# Patient Record
Sex: FEMALE | Race: BLACK OR AFRICAN AMERICAN | NOT HISPANIC OR LATINO | Employment: OTHER | URBAN - METROPOLITAN AREA
[De-identification: names, ages, dates, MRNs, and addresses within clinical notes are randomized per-mention and may not be internally consistent; named-entity substitution may affect disease eponyms.]

---

## 2017-01-09 PROBLEM — I25.2 HX OF NON-ST ELEVATION MYOCARDIAL INFARCTION (NSTEMI): Status: ACTIVE | Noted: 2017-01-09

## 2017-01-09 PROBLEM — Z01.818 PRE-OP EVALUATION: Status: ACTIVE | Noted: 2017-01-09

## 2017-02-14 PROBLEM — H26.9 CATARACT: Status: ACTIVE | Noted: 2017-02-14

## 2017-07-14 PROBLEM — E11.311 DIABETIC MACULAR EDEMA: Status: ACTIVE | Noted: 2017-07-14

## 2017-09-11 PROBLEM — H25.812 COMBINED FORMS OF AGE-RELATED CATARACT OF LEFT EYE: Status: ACTIVE | Noted: 2017-09-11

## 2017-12-05 PROBLEM — R82.90 FOUL SMELLING URINE: Status: ACTIVE | Noted: 2017-12-05

## 2017-12-05 PROBLEM — K59.00 CONSTIPATION: Status: ACTIVE | Noted: 2017-12-05

## 2018-02-08 PROBLEM — R21 RASH: Status: ACTIVE | Noted: 2018-02-08

## 2018-02-08 PROBLEM — L29.9 ITCHING: Status: ACTIVE | Noted: 2018-02-08

## 2018-08-09 PROBLEM — N30.00 ACUTE CYSTITIS: Status: ACTIVE | Noted: 2018-08-09

## 2018-08-09 PROBLEM — R30.0 DYSURIA: Status: ACTIVE | Noted: 2018-08-09

## 2018-08-09 PROBLEM — I16.0 HYPERTENSIVE URGENCY: Status: ACTIVE | Noted: 2018-08-09

## 2018-08-11 PROBLEM — N30.00 ACUTE CYSTITIS: Status: ACTIVE | Noted: 2018-08-11

## 2018-08-22 PROBLEM — M06.9 RHEUMATOID ARTHRITIS OF HAND: Status: ACTIVE | Noted: 2018-08-22

## 2018-09-18 PROBLEM — Z12.11 SCREENING FOR COLON CANCER: Status: ACTIVE | Noted: 2018-09-18

## 2018-10-24 PROBLEM — I16.1 HYPERTENSIVE EMERGENCY: Status: ACTIVE | Noted: 2018-10-24

## 2018-10-24 PROBLEM — R33.9 URINARY RETENTION: Status: ACTIVE | Noted: 2018-10-24

## 2018-10-25 PROBLEM — R31.9 HEMATURIA: Status: ACTIVE | Noted: 2018-10-25

## 2018-10-26 PROBLEM — N17.9 AKI (ACUTE KIDNEY INJURY): Status: ACTIVE | Noted: 2018-10-26

## 2018-10-29 ENCOUNTER — PATIENT OUTREACH (OUTPATIENT)
Dept: ADMINISTRATIVE | Facility: CLINIC | Age: 58
End: 2018-10-29

## 2018-10-29 NOTE — PATIENT INSTRUCTIONS
"Discharge Instructions for High Blood Pressure (Hypertension)  You have been diagnosed with high blood pressure (also called hypertension). This means the force of blood against your artery walls is too strong. It also means your heart is working hard to move blood. High blood pressure usually has no symptoms, but over time, it can damage your heart, blood vessels, eyes, kidneys, and other organs. With help from your doctor, you can manage your blood pressure and protect your health.  Taking medicine  · Learn to take your own blood pressure. Keep a record of your results. Ask your doctor which readings mean that you need medical attention.  · Take your blood pressure medicine exactly as directed. Dont skip doses. Missing doses can cause your blood pressure to get out of control.  · If you do miss a dose (or doses) check with your healthcare provider about what to do.  · Avoid medicine that contain heart stimulants, including over-the-counter drugs. Check for warnings about high blood pressure on the label. Ask the pharmacist before purchasing something you haven't used before  · Check with your doctor or pharmacist before taking a decongestant. Some decongestants can worsen high blood pressure.  Lifestyle changes  · Maintain a healthy weight. Get help to lose any extra pounds.  · Cut back on salt.  ? Limit canned, dried, packaged, and fast foods.  ? Dont add salt to your food at the table.  ? Season foods with herbs instead of salt when you cook.  ? Request no added salt when you go to a restaurant.  ? The American Heart Associations (AHA) "ideal" sodium intake recommendation is 1,500 milligrams per day.  However, since American's eat so much salt, the AHA says a positive change can occur by cutting back to even 2,400 milligrams of sodium a day.   · Follow the DASH (Dietary Approaches to Stop Hypertension) eating plan. This plan recommends vegetables, fruits, whole gains, and other heart healthy foods.  · Begin " an exercise program. Ask your doctor how to get started. The American Heart Association recommends aerobic exercise 3 to 4 times a week for an average of 40 minutes at a time, with your doctor's approval. Simple activities like walking or gardening can help.  · Break the smoking habit. Enroll in a stop-smoking program to improve your chances of success. Ask your healthcare provider about programs and medicines to help you stop smoking.  · Limit drinks that contain caffeine (coffee, black or green tea, cola) to 2 per day.  · Never take stimulants such as amphetamines or cocaine; these drugs can be deadly for someone with high blood pressure.  · Control your stress. Learn stress-management techniques.  · Limit alcohol to no more than 1 drink a day for women and 2 drinks a day for men.  Follow-up care  Make a follow-up appointment as directed by our staff.     When to seek medical care  Call your doctor immediately or seek emergency care if you have any of the following:  · Chest pain or shortness of breath (call 911)  · Moderate to severe headache  · Weakness in the muscles of your face, arms, or legs  · Trouble speaking  · Extreme drowsiness  · Confusion  · Fainting or dizziness  · Pulsating or rushing sound in your ears  · Unexplained nosebleed  · Weakness, tingling, or numbness of your face, arms, or legs  · Change in vision  · Blood pressure measured at home that is greater than 180/110   Date Last Reviewed: 4/27/2016  © 9783-2834 [employer]. [address] [place] [address], [place] [address]. All rights reserved. This information is not intended as a substitute for professional medical care. Always follow your healthcare professional's instructions.

## 2018-10-29 NOTE — PROGRESS NOTES
[phone] [place]  [phone] [facility]  [phone] [place]  [phone]   C3 nurse attempted to contact patient. The following occurred:   C3 nurse attempted to contact Lilly Frye for a TCC post hospital discharge follow up call. The patient is unable to conduct the call @ this time. The patient requested a callback.    The patient has a scheduled HOSFU appointment with Dr Kenney Davies on 11/08/18  @ 0900 hrs.

## 2018-11-07 PROBLEM — I50.9 ACUTE ON CHRONIC CONGESTIVE HEART FAILURE: Status: ACTIVE | Noted: 2018-11-07

## 2018-11-07 PROBLEM — D63.8 ANEMIA OF CHRONIC DISEASE: Status: ACTIVE | Noted: 2018-11-07

## 2018-11-12 ENCOUNTER — HOSPITAL ENCOUNTER (INPATIENT)
Facility: HOSPITAL | Age: 58
LOS: 2 days | Discharge: HOME | DRG: 247 | End: 2018-11-14
Attending: INTERNAL MEDICINE | Admitting: INTERNAL MEDICINE
Payer: MEDICARE

## 2018-11-12 DIAGNOSIS — I10 ESSENTIAL HYPERTENSION: Chronic | ICD-10-CM

## 2018-11-12 DIAGNOSIS — I50.9 CHF (CONGESTIVE HEART FAILURE): ICD-10-CM

## 2018-11-12 DIAGNOSIS — I25.83 CORONARY ARTERY DISEASE DUE TO LIPID RICH PLAQUE: Primary | ICD-10-CM

## 2018-11-12 DIAGNOSIS — Z79.4 TYPE 2 DIABETES MELLITUS WITH COMPLICATION, WITH LONG-TERM CURRENT USE OF INSULIN: ICD-10-CM

## 2018-11-12 DIAGNOSIS — E11.8 TYPE 2 DIABETES MELLITUS WITH COMPLICATION, WITHOUT LONG-TERM CURRENT USE OF INSULIN: ICD-10-CM

## 2018-11-12 DIAGNOSIS — R56.9 SEIZURE: ICD-10-CM

## 2018-11-12 DIAGNOSIS — I50.9 CONGESTIVE HEART FAILURE, UNSPECIFIED HF CHRONICITY, UNSPECIFIED HEART FAILURE TYPE: ICD-10-CM

## 2018-11-12 DIAGNOSIS — E11.8 TYPE 2 DIABETES MELLITUS WITH COMPLICATION, WITH LONG-TERM CURRENT USE OF INSULIN: ICD-10-CM

## 2018-11-12 DIAGNOSIS — I50.9 ACUTE ON CHRONIC HEART FAILURE, UNSPECIFIED HEART FAILURE TYPE: ICD-10-CM

## 2018-11-12 DIAGNOSIS — Z09 POSTOPERATIVE EXAMINATION: ICD-10-CM

## 2018-11-12 DIAGNOSIS — I25.10 CORONARY ARTERY DISEASE DUE TO LIPID RICH PLAQUE: Primary | ICD-10-CM

## 2018-11-12 DIAGNOSIS — I25.10 CORONARY ARTERY DISEASE DUE TO LIPID RICH PLAQUE: ICD-10-CM

## 2018-11-12 DIAGNOSIS — I25.83 CORONARY ARTERY DISEASE DUE TO LIPID RICH PLAQUE: ICD-10-CM

## 2018-11-12 PROBLEM — R79.89 ELEVATED TROPONIN: Status: ACTIVE | Noted: 2018-11-12

## 2018-11-12 PROBLEM — J96.21 ACUTE ON CHRONIC RESPIRATORY FAILURE WITH HYPOXIA: Status: ACTIVE | Noted: 2018-11-12

## 2018-11-12 LAB — POCT GLUCOSE: 135 MG/DL (ref 70–110)

## 2018-11-12 PROCEDURE — 25000003 PHARM REV CODE 250: Performed by: INTERNAL MEDICINE

## 2018-11-12 PROCEDURE — 11000001 HC ACUTE MED/SURG PRIVATE ROOM

## 2018-11-12 PROCEDURE — 63600175 PHARM REV CODE 636 W HCPCS: Performed by: INTERNAL MEDICINE

## 2018-11-12 PROCEDURE — S5571 INSULIN DISPOS PEN 3 ML: HCPCS | Performed by: INTERNAL MEDICINE

## 2018-11-12 RX ORDER — HYDROCODONE BITARTRATE AND ACETAMINOPHEN 5; 325 MG/1; MG/1
1 TABLET ORAL EVERY 6 HOURS PRN
Status: DISCONTINUED | OUTPATIENT
Start: 2018-11-12 | End: 2018-11-14 | Stop reason: HOSPADM

## 2018-11-12 RX ORDER — AMOXICILLIN 250 MG
1 CAPSULE ORAL 2 TIMES DAILY PRN
Status: DISCONTINUED | OUTPATIENT
Start: 2018-11-12 | End: 2018-11-14 | Stop reason: HOSPADM

## 2018-11-12 RX ORDER — HYDRALAZINE HYDROCHLORIDE 20 MG/ML
10 INJECTION INTRAMUSCULAR; INTRAVENOUS EVERY 8 HOURS PRN
Status: DISCONTINUED | OUTPATIENT
Start: 2018-11-12 | End: 2018-11-14 | Stop reason: HOSPADM

## 2018-11-12 RX ORDER — NITROGLYCERIN 0.4 MG/1
0.4 TABLET SUBLINGUAL EVERY 5 MIN PRN
Status: DISCONTINUED | OUTPATIENT
Start: 2018-11-12 | End: 2018-11-14 | Stop reason: HOSPADM

## 2018-11-12 RX ORDER — FLUOXETINE HYDROCHLORIDE 20 MG/1
20 CAPSULE ORAL DAILY
Status: DISCONTINUED | OUTPATIENT
Start: 2018-11-13 | End: 2018-11-14 | Stop reason: HOSPADM

## 2018-11-12 RX ORDER — ASPIRIN 81 MG/1
81 TABLET ORAL DAILY
Status: DISCONTINUED | OUTPATIENT
Start: 2018-11-13 | End: 2018-11-14 | Stop reason: HOSPADM

## 2018-11-12 RX ORDER — ONDANSETRON 2 MG/ML
4 INJECTION INTRAMUSCULAR; INTRAVENOUS EVERY 6 HOURS PRN
Status: DISCONTINUED | OUTPATIENT
Start: 2018-11-12 | End: 2018-11-14 | Stop reason: HOSPADM

## 2018-11-12 RX ORDER — LOSARTAN POTASSIUM 50 MG/1
50 TABLET ORAL NIGHTLY
Status: DISCONTINUED | OUTPATIENT
Start: 2018-11-12 | End: 2018-11-14 | Stop reason: HOSPADM

## 2018-11-12 RX ORDER — ACETAMINOPHEN 325 MG/1
650 TABLET ORAL EVERY 6 HOURS PRN
Status: DISCONTINUED | OUTPATIENT
Start: 2018-11-12 | End: 2018-11-14 | Stop reason: HOSPADM

## 2018-11-12 RX ORDER — INSULIN ASPART 100 [IU]/ML
0-5 INJECTION, SOLUTION INTRAVENOUS; SUBCUTANEOUS
Status: DISCONTINUED | OUTPATIENT
Start: 2018-11-12 | End: 2018-11-13

## 2018-11-12 RX ORDER — LEVETIRACETAM 500 MG/1
1000 TABLET ORAL 2 TIMES DAILY
Status: DISCONTINUED | OUTPATIENT
Start: 2018-11-12 | End: 2018-11-14 | Stop reason: HOSPADM

## 2018-11-12 RX ORDER — SODIUM CHLORIDE 0.9 % (FLUSH) 0.9 %
5 SYRINGE (ML) INJECTION
Status: DISCONTINUED | OUTPATIENT
Start: 2018-11-12 | End: 2018-11-14 | Stop reason: HOSPADM

## 2018-11-12 RX ORDER — ATORVASTATIN CALCIUM 40 MG/1
80 TABLET, FILM COATED ORAL DAILY
Status: DISCONTINUED | OUTPATIENT
Start: 2018-11-13 | End: 2018-11-14 | Stop reason: HOSPADM

## 2018-11-12 RX ORDER — INSULIN ASPART 100 [IU]/ML
14 INJECTION, SOLUTION INTRAVENOUS; SUBCUTANEOUS
Status: DISCONTINUED | OUTPATIENT
Start: 2018-11-13 | End: 2018-11-14 | Stop reason: HOSPADM

## 2018-11-12 RX ORDER — CLOPIDOGREL BISULFATE 75 MG/1
75 TABLET ORAL DAILY
Status: DISCONTINUED | OUTPATIENT
Start: 2018-11-13 | End: 2018-11-14 | Stop reason: HOSPADM

## 2018-11-12 RX ORDER — CARVEDILOL 25 MG/1
25 TABLET ORAL 2 TIMES DAILY
Status: DISCONTINUED | OUTPATIENT
Start: 2018-11-12 | End: 2018-11-14 | Stop reason: HOSPADM

## 2018-11-12 RX ADMIN — INSULIN DETEMIR 35 UNITS: 100 INJECTION, SOLUTION SUBCUTANEOUS at 10:11

## 2018-11-12 RX ADMIN — LOSARTAN POTASSIUM 50 MG: 50 TABLET, FILM COATED ORAL at 10:11

## 2018-11-12 RX ADMIN — LEVETIRACETAM 1000 MG: 500 TABLET ORAL at 10:11

## 2018-11-12 RX ADMIN — CARVEDILOL 25 MG: 25 TABLET, FILM COATED ORAL at 10:11

## 2018-11-12 NOTE — Clinical Note
100 ml injected throughout the case. 50 mL total wasted during the case. 150 mL total used in the case.

## 2018-11-12 NOTE — Clinical Note
The site was marked. Prepped: groin and right radial. Prepped with: ChloraPrep. The site was clipped. The patient was draped.

## 2018-11-12 NOTE — Clinical Note
The DP pulses are detected w/ doppler bilaterally. The PT pulses are detected w/ doppler bilaterally. The right radial pulse is 2+.

## 2018-11-13 LAB
ALBUMIN SERPL BCP-MCNC: 1.8 G/DL
ALP SERPL-CCNC: 81 U/L
ALT SERPL W/O P-5'-P-CCNC: 22 U/L
ANION GAP SERPL CALC-SCNC: 6 MMOL/L
ANION GAP SERPL CALC-SCNC: 6 MMOL/L
AST SERPL-CCNC: 28 U/L
BILIRUB SERPL-MCNC: 0.3 MG/DL
BUN SERPL-MCNC: 23 MG/DL
BUN SERPL-MCNC: 24 MG/DL
CALCIUM SERPL-MCNC: 8.7 MG/DL
CALCIUM SERPL-MCNC: 8.8 MG/DL
CHLORIDE SERPL-SCNC: 103 MMOL/L
CHLORIDE SERPL-SCNC: 104 MMOL/L
CO2 SERPL-SCNC: 30 MMOL/L
CO2 SERPL-SCNC: 31 MMOL/L
CREAT SERPL-MCNC: 1.2 MG/DL
CREAT SERPL-MCNC: 1.3 MG/DL
ERYTHROCYTE [DISTWIDTH] IN BLOOD BY AUTOMATED COUNT: 12.9 %
EST. GFR  (AFRICAN AMERICAN): 52 ML/MIN/1.73 M^2
EST. GFR  (AFRICAN AMERICAN): 57 ML/MIN/1.73 M^2
EST. GFR  (NON AFRICAN AMERICAN): 45 ML/MIN/1.73 M^2
EST. GFR  (NON AFRICAN AMERICAN): 50 ML/MIN/1.73 M^2
GLUCOSE SERPL-MCNC: 138 MG/DL
GLUCOSE SERPL-MCNC: 142 MG/DL
HCT VFR BLD AUTO: 27 %
HGB BLD-MCNC: 8.3 G/DL
MCH RBC QN AUTO: 28.6 PG
MCHC RBC AUTO-ENTMCNC: 30.7 G/DL
MCV RBC AUTO: 93 FL
PLATELET # BLD AUTO: 313 K/UL
PMV BLD AUTO: 9.7 FL
POC ACTIVATED CLOTTING TIME K: 197 SEC (ref 74–137)
POC ACTIVATED CLOTTING TIME K: 263 SEC (ref 74–137)
POCT GLUCOSE: 163 MG/DL (ref 70–110)
POCT GLUCOSE: 266 MG/DL (ref 70–110)
POCT GLUCOSE: 302 MG/DL (ref 70–110)
POCT GLUCOSE: 35 MG/DL (ref 70–110)
POCT GLUCOSE: 51 MG/DL (ref 70–110)
POCT GLUCOSE: 88 MG/DL (ref 70–110)
POTASSIUM SERPL-SCNC: 4.3 MMOL/L
POTASSIUM SERPL-SCNC: 4.5 MMOL/L
PROT SERPL-MCNC: 5.7 G/DL
RBC # BLD AUTO: 2.9 M/UL
SAMPLE: ABNORMAL
SAMPLE: ABNORMAL
SODIUM SERPL-SCNC: 140 MMOL/L
SODIUM SERPL-SCNC: 140 MMOL/L
WBC # BLD AUTO: 4.3 K/UL

## 2018-11-13 PROCEDURE — C1876 STENT, NON-COA/NON-COV W/DEL: HCPCS | Performed by: INTERNAL MEDICINE

## 2018-11-13 PROCEDURE — C1769 GUIDE WIRE: HCPCS | Performed by: INTERNAL MEDICINE

## 2018-11-13 PROCEDURE — 93458 L HRT ARTERY/VENTRICLE ANGIO: CPT | Mod: 59 | Performed by: INTERNAL MEDICINE

## 2018-11-13 PROCEDURE — 027034Z DILATION OF CORONARY ARTERY, ONE ARTERY WITH DRUG-ELUTING INTRALUMINAL DEVICE, PERCUTANEOUS APPROACH: ICD-10-PCS | Performed by: INTERNAL MEDICINE

## 2018-11-13 PROCEDURE — 25500020 PHARM REV CODE 255: Performed by: INTERNAL MEDICINE

## 2018-11-13 PROCEDURE — 27201423 OPTIME MED/SURG SUP & DEVICES STERILE SUPPLY: Performed by: INTERNAL MEDICINE

## 2018-11-13 PROCEDURE — 94761 N-INVAS EAR/PLS OXIMETRY MLT: CPT

## 2018-11-13 PROCEDURE — 63600175 PHARM REV CODE 636 W HCPCS

## 2018-11-13 PROCEDURE — 99152 MOD SED SAME PHYS/QHP 5/>YRS: CPT | Mod: ,,, | Performed by: INTERNAL MEDICINE

## 2018-11-13 PROCEDURE — 93005 ELECTROCARDIOGRAM TRACING: CPT

## 2018-11-13 PROCEDURE — 63600175 PHARM REV CODE 636 W HCPCS: Performed by: INTERNAL MEDICINE

## 2018-11-13 PROCEDURE — 92928 PRQ TCAT PLMT NTRAC ST 1 LES: CPT | Mod: LD,,, | Performed by: INTERNAL MEDICINE

## 2018-11-13 PROCEDURE — C1753 CATH, INTRAVAS ULTRASOUND: HCPCS | Performed by: INTERNAL MEDICINE

## 2018-11-13 PROCEDURE — 25000003 PHARM REV CODE 250: Performed by: INTERNAL MEDICINE

## 2018-11-13 PROCEDURE — 99223 1ST HOSP IP/OBS HIGH 75: CPT | Mod: AI,,, | Performed by: INTERNAL MEDICINE

## 2018-11-13 PROCEDURE — 85027 COMPLETE CBC AUTOMATED: CPT

## 2018-11-13 PROCEDURE — 25000003 PHARM REV CODE 250

## 2018-11-13 PROCEDURE — C1887 CATHETER, GUIDING: HCPCS | Performed by: INTERNAL MEDICINE

## 2018-11-13 PROCEDURE — 4A023N7 MEASUREMENT OF CARDIAC SAMPLING AND PRESSURE, LEFT HEART, PERCUTANEOUS APPROACH: ICD-10-PCS | Performed by: INTERNAL MEDICINE

## 2018-11-13 PROCEDURE — 80048 BASIC METABOLIC PNL TOTAL CA: CPT

## 2018-11-13 PROCEDURE — 25500020 PHARM REV CODE 255

## 2018-11-13 PROCEDURE — C1725 CATH, TRANSLUMIN NON-LASER: HCPCS | Performed by: INTERNAL MEDICINE

## 2018-11-13 PROCEDURE — 80053 COMPREHEN METABOLIC PANEL: CPT

## 2018-11-13 PROCEDURE — 25000003 PHARM REV CODE 250: Performed by: NURSE PRACTITIONER

## 2018-11-13 PROCEDURE — 11000001 HC ACUTE MED/SURG PRIVATE ROOM

## 2018-11-13 PROCEDURE — 93458 L HRT ARTERY/VENTRICLE ANGIO: CPT | Mod: 26,59,, | Performed by: INTERNAL MEDICINE

## 2018-11-13 PROCEDURE — 92928 PRQ TCAT PLMT NTRAC ST 1 LES: CPT | Mod: LD | Performed by: INTERNAL MEDICINE

## 2018-11-13 PROCEDURE — 36415 COLL VENOUS BLD VENIPUNCTURE: CPT

## 2018-11-13 PROCEDURE — 63600175 PHARM REV CODE 636 W HCPCS: Performed by: NURSE PRACTITIONER

## 2018-11-13 PROCEDURE — C1894 INTRO/SHEATH, NON-LASER: HCPCS | Performed by: INTERNAL MEDICINE

## 2018-11-13 DEVICE — IMPLANTABLE DEVICE: Type: IMPLANTABLE DEVICE | Site: CORONARY | Status: FUNCTIONAL

## 2018-11-13 RX ORDER — HEPARIN SODIUM 1000 [USP'U]/ML
INJECTION, SOLUTION INTRAVENOUS; SUBCUTANEOUS
Status: DISCONTINUED | OUTPATIENT
Start: 2018-11-13 | End: 2018-11-13 | Stop reason: HOSPADM

## 2018-11-13 RX ORDER — GLUCAGON 1 MG
1 KIT INJECTION
Status: DISCONTINUED | OUTPATIENT
Start: 2018-11-13 | End: 2018-11-14 | Stop reason: HOSPADM

## 2018-11-13 RX ORDER — IBUPROFEN 200 MG
24 TABLET ORAL
Status: DISCONTINUED | OUTPATIENT
Start: 2018-11-13 | End: 2018-11-14 | Stop reason: HOSPADM

## 2018-11-13 RX ORDER — MIDAZOLAM HYDROCHLORIDE 1 MG/ML
INJECTION, SOLUTION INTRAMUSCULAR; INTRAVENOUS
Status: DISCONTINUED | OUTPATIENT
Start: 2018-11-13 | End: 2018-11-13 | Stop reason: HOSPADM

## 2018-11-13 RX ORDER — HEPARIN SODIUM 200 [USP'U]/100ML
INJECTION, SOLUTION INTRAVENOUS
Status: DISCONTINUED | OUTPATIENT
Start: 2018-11-13 | End: 2018-11-14 | Stop reason: HOSPADM

## 2018-11-13 RX ORDER — IODIXANOL 320 MG/ML
INJECTION, SOLUTION INTRAVASCULAR
Status: DISCONTINUED | OUTPATIENT
Start: 2018-11-13 | End: 2018-11-13 | Stop reason: HOSPADM

## 2018-11-13 RX ORDER — IBUPROFEN 200 MG
16 TABLET ORAL
Status: DISCONTINUED | OUTPATIENT
Start: 2018-11-13 | End: 2018-11-14 | Stop reason: HOSPADM

## 2018-11-13 RX ORDER — SODIUM CHLORIDE 9 MG/ML
INJECTION, SOLUTION INTRAVENOUS CONTINUOUS
Status: DISCONTINUED | OUTPATIENT
Start: 2018-11-13 | End: 2018-11-14 | Stop reason: HOSPADM

## 2018-11-13 RX ORDER — INSULIN ASPART 100 [IU]/ML
1-10 INJECTION, SOLUTION INTRAVENOUS; SUBCUTANEOUS
Status: DISCONTINUED | OUTPATIENT
Start: 2018-11-13 | End: 2018-11-14 | Stop reason: HOSPADM

## 2018-11-13 RX ORDER — DIPHENHYDRAMINE HCL 25 MG
50 CAPSULE ORAL ONCE
Status: COMPLETED | OUTPATIENT
Start: 2018-11-13 | End: 2018-11-13

## 2018-11-13 RX ORDER — LIDOCAINE HYDROCHLORIDE 10 MG/ML
INJECTION, SOLUTION EPIDURAL; INFILTRATION; INTRACAUDAL; PERINEURAL
Status: DISCONTINUED | OUTPATIENT
Start: 2018-11-13 | End: 2018-11-13 | Stop reason: HOSPADM

## 2018-11-13 RX ORDER — VERAPAMIL HYDROCHLORIDE 2.5 MG/ML
INJECTION, SOLUTION INTRAVENOUS
Status: DISCONTINUED | OUTPATIENT
Start: 2018-11-13 | End: 2018-11-13 | Stop reason: HOSPADM

## 2018-11-13 RX ORDER — FENTANYL CITRATE 50 UG/ML
INJECTION, SOLUTION INTRAMUSCULAR; INTRAVENOUS
Status: DISCONTINUED | OUTPATIENT
Start: 2018-11-13 | End: 2018-11-13 | Stop reason: HOSPADM

## 2018-11-13 RX ORDER — DEXTROSE 50 % IN WATER (D50W) INTRAVENOUS SYRINGE
Status: DISCONTINUED | OUTPATIENT
Start: 2018-11-13 | End: 2018-11-13 | Stop reason: HOSPADM

## 2018-11-13 RX ADMIN — LOSARTAN POTASSIUM 50 MG: 50 TABLET, FILM COATED ORAL at 09:11

## 2018-11-13 RX ADMIN — ATORVASTATIN CALCIUM 80 MG: 40 TABLET, FILM COATED ORAL at 08:11

## 2018-11-13 RX ADMIN — CARVEDILOL 25 MG: 25 TABLET, FILM COATED ORAL at 09:11

## 2018-11-13 RX ADMIN — LEVETIRACETAM 1000 MG: 500 TABLET ORAL at 08:11

## 2018-11-13 RX ADMIN — INSULIN ASPART 4 UNITS: 100 INJECTION, SOLUTION INTRAVENOUS; SUBCUTANEOUS at 09:11

## 2018-11-13 RX ADMIN — ASPIRIN 81 MG: 81 TABLET, COATED ORAL at 08:11

## 2018-11-13 RX ADMIN — LEVETIRACETAM 1000 MG: 500 TABLET ORAL at 09:11

## 2018-11-13 RX ADMIN — DIPHENHYDRAMINE HYDROCHLORIDE 50 MG: 25 CAPSULE ORAL at 02:11

## 2018-11-13 RX ADMIN — INSULIN ASPART 14 UNITS: 100 INJECTION, SOLUTION INTRAVENOUS; SUBCUTANEOUS at 08:11

## 2018-11-13 RX ADMIN — INSULIN DETEMIR 35 UNITS: 100 INJECTION, SOLUTION SUBCUTANEOUS at 09:11

## 2018-11-13 RX ADMIN — CARVEDILOL 25 MG: 25 TABLET, FILM COATED ORAL at 08:11

## 2018-11-13 RX ADMIN — SODIUM CHLORIDE: 0.9 INJECTION, SOLUTION INTRAVENOUS at 02:11

## 2018-11-13 RX ADMIN — DEXTROSE MONOHYDRATE 25 G: 25 INJECTION, SOLUTION INTRAVENOUS at 11:11

## 2018-11-13 RX ADMIN — CLOPIDOGREL BISULFATE 75 MG: 75 TABLET ORAL at 08:11

## 2018-11-13 RX ADMIN — FLUOXETINE 20 MG: 20 CAPSULE ORAL at 08:11

## 2018-11-13 NOTE — PLAN OF CARE
11/13/18 1045   Final Note   Assessment Type Final Discharge Note   Anticipated Discharge Disposition Other Fac MI   What phone number can be called within the next 1-3 days to see how you are doing after discharge? [phone]   Hospital Follow Up  Appt(s) scheduled? Yes   Discharge plans and expectations educations in teach back method with documentation complete? Yes   Right Care Referral Info   Post Acute Recommendation IRF  (Pt transferred to another medical facility for medical care.)

## 2018-11-13 NOTE — HOSPITAL COURSE
11/13/2018 NPO for [facility] today. Hemodynamically stable. Cr 1.2.   See report for details  Prox LAD lesion is 95% stenosed. This is the culprit lesion. The lesion is < 10 mm long. Lesion shape is eccentric. Lesion contour is smooth. The lesion was not previously treated.  STENT RESOLUTE INTGRTY 4.00X15  11/14/2018 Patient was recovered overnight without incident and found to be tolerating her ADLs at baseline. Patient appropriate for discharge. Compliance with medication was discussed with particular attention paid to importance of  DAPT for one year without interruption. The risk of abrupt stent occlusion and MI with early discontinuation was specifically stressed. Verbalized understanding and will follow up in the cardiology clinic in 2-3 weeks

## 2018-11-13 NOTE — PLAN OF CARE
Problem: Patient Care Overview  Goal: Plan of Care Review  Outcome: Ongoing (interventions implemented as appropriate)  Plan of care reviewed with patient. Patient verbalized complete understanding. Patient went for cath lab procedure today Fall precautions maintained. Bed in lowest position, locked, call light within reach, and bed alarm on. Side rails up x2 with slip resistant socks on. Nurse instructed patient to notify staff for any assistance and patient verbalized complete understanding. Patient on telemetry throughout shift with no ectopy noted. will continue to monitor

## 2018-11-13 NOTE — PLAN OF CARE
Problem: Patient Care Overview  Goal: Plan of Care Review  Outcome: Ongoing (interventions implemented as appropriate)  Plan of care reviewed with patient. Patient is AAOx4. On continuous cardiac monitoring, no true red alarms noted. Patient rested comfortably throughout night. O2 going at 2L NC. NS going at 100 mL/hr. NPO diet order maintained. Fall precautions explained and maintained. Advised patient to use call light for assistance, verbalized understanding. Will continue to monitor.

## 2018-11-13 NOTE — NURSING
Pt's BS 51. Pt currently in post cath. Spoke to Janet and she stated that the nurse is addressing it. VN notified floor nurse.

## 2018-11-13 NOTE — HPI
Lilly Frye is a 58 y.o. female with CAD s/p 2 stents in 2015, CVA, seizures, HTN, HFpEF, uncontrolled DM2 (A1C >14), neuropathy, depression. Presented to [facility] ED with complaint of CP x 1 day and worsening SOB x 2 days. Had recent admit 2 weeks ago for same complaint, at that time was found to have NSTEMI believed to be 2/2 demand (uncontrolled HTN). She was ordered an outpatient stress test but has not yet undergone testing. CP was described as substernal, L sided, 10/10, sharp, and occurring while at rest. Patient states she can only walk a few feet before becoming SOB. Patient has worsened orthopnea from 2 pillows to 3 pillows and worsened leg swelling. Patient has not been adherent to low sodium diet, often eating ramen noodles or other processed foods. Patient did try to increase oral lasix at home to 80mg BID, but without relief. EKG showed no acute ST elevation but trop was elevated at 0.126, and BNP of 944. She also was exhibiting symptoms of volume overload and was admitted for CHF exacerbation. She was diuresed with IV lasix and her creatinine elevated from 1.5 to 1.6. She was still slightly volume overloaded so Lasix was continued. Her CP never resolved so cardiology was consulted and decided to do an LHC which showed 90% stenosis in proximal and ostial LAD. They recommended transfer for a PCI and to continue asa and lipitor 80 and to give a loading dose of plavix 300 and start plavix 75mg daily. She was transferred to [facility] for revascularization.

## 2018-11-13 NOTE — ASSESSMENT & PLAN NOTE
S/p MATTEO to LAD and LCx in 2015  Now with 90% prox and os LAD stenosis, transferred from [facility] for revascularization   On asa, Plavix, statin, BB, ARB- continued  NPO for revasc today  Cr 1.2  LVEF normal per outside report     Needs better diabetes management A1c 14

## 2018-11-13 NOTE — ASSESSMENT & PLAN NOTE
HFpEF  Presented to [facility] with ADHF in setting of dietary sodium indiscretion  Euvolemic at present   Dietician to be consulted this admission   BB, ARB continued

## 2018-11-13 NOTE — BRIEF OP NOTE
S/p LAD PCI with 4.0 x 18 mm Resolute MATTEO  She tolerated procedure well         Full report to follow        Plan:    Cardiac rehab II  Aspirin + DAPT  Statin  Risk factors modification        DC in am

## 2018-11-13 NOTE — PROGRESS NOTES
.Pharmacy New Medication Education    Patient accepted medication education.    Pharmacy educated patient on name and purpose of medications and possible side effects, using the teach-back method.     Current Inpatient Medication Orders   0.9% NaCl infusion   acetaminophen tablet 650 mg   aspirin EC tablet 81 mg   atorvastatin tablet 80 mg   carvedilol tablet 25 mg   clopidogrel tablet 75 mg   FLUoxetine capsule 20 mg   hydrALAZINE injection 10 mg   HYDROcodone-acetaminophen 5-325 mg per tablet 1 tablet   insulin aspart U-100 pen 14 Units   insulin detemir U-100 pen 35 Units   levETIRAcetam tablet 1,000 mg   losartan tablet 50 mg   nitroGLYCERIN SL tablet 0.4 mg   ondansetron injection 4 mg   promethazine (PHENERGAN) 6.25 mg in dextrose 5 % 50 mL IVPB   senna-docusate 8.6-50 mg per tablet 1 tablet   sodium chloride 0.9% flush 5 mL       Learners of pharmacy medication education included:  Patient    Patient +/- learner response:  Verbalized Understanding, Teachback

## 2018-11-13 NOTE — NURSING
VN cued into pt's room for introduction. Pt currently on phone. Will cue back into room as available.

## 2018-11-13 NOTE — NURSING
Patient transferred to floor via stretcher with telemetry monitor in place. Patient has no complaints of pain and NAD noted. Upon arrival to room patient placed on portable telemetry box and assisted to bed by floor nurse and cath lab nurse. SPENCER Warner made aware of patient's arrival. Floor nurse and cath lab nurse assess site. RIGHT RADIAL site dry drainage under Vasc band, 10cc air left in band. No swelling, bleeding or hematoma at site with +2 palpable pulses.  All questions answered. Family and floor nurse at bedside.

## 2018-11-13 NOTE — PLAN OF CARE
"Future Appointments   Date Time Provider Department Center   11/20/2018  8:25 AM [facility] LAB [facility] LAB [place]   11/20/2018  9:30 AM Christos Lamas MD [facility] CCCLIN ALEX CCC   1/14/2019  2:00 [facility] OPHTHAL ALEX GRE[place]   1/17/2019  8:00 AM OPHTHALMOLOGY TESTING, [facility] OPHTHAL ALEX GREEN   1/17/2019  9:00 AM OPHTHALMOLOGY GLAUCOMA, [facility] OPHTHAL ALEX GREEN   1/24/2019 10:00 AM UROLOGY CLINIC [facility] UROLOGY ALEX GOLD   1/24/2019  1:30 PM Jaylan Rivera MD [facility] RHEUM ALEX ACT   2/11/2019 10:15 AM Kenyon Lopez MD [facility] CARDIO ALEX [facility]       Patient scheduled for [facility] today.     11/13/18 1010   Discharge Assessment   Assessment Type Discharge Planning Assessment   Confirmed/corrected address and phone number on facesheet? Yes   Assessment information obtained from? Patient   Communicated expected length of stay with patient/caregiver yes   Prior to hospitilization cognitive status: Alert/Oriented   Prior to hospitalization functional status: Independent   Current cognitive status: Alert/Oriented   Current Functional Status: Independent   Lives With alone   Able to Return to Prior Arrangements yes   Is patient able to care for self after discharge? Yes   Patient's perception of discharge disposition home or selfcare   Readmission Within The Last 30 Days other (see comments)  (patient states "I got short winded again")   Patient currently being followed by outpatient case management? No   Patient currently receives any other outside agency services? No   Equipment Currently Used at Home walker, rolling   Do you have any problems affording any of your prescribed medications? No   Is the patient taking medications as prescribed? yes   Transportation Available family or friend will provide   Discharge Plan A Home   Discharge Plan B Home;Home with family   Patient/Family In Agreement With Plan yes   Does the patient have family/friends to help with healtcare needs after discharge? yes "   Does the patient have transportation to healthcare appointments? Yes   Readmission Questionnaire   At the time of your discharge, did someone talk to you about what your health problems were? Yes   At the time of discharge, did someone talk to you about what to watch out for regarding worsening of your health problem? Yes   At the time of discharge, did someone talk to you about what to do if you experienced worsening of your health problem? Yes   At the time of discharge, did someone talk to you about which medication to take when you left the hospital and which ones to stop taking? Yes   At the time of discharge, did someone talk to you about when and where to follow up with a doctor after you left the hospital? Yes   What do you believe caused you to be sick enough to be re-admitted? I got shortwinded   How often do you need to have someone help you when you read instructions, pamphlets, or other written material from your doctor or pharmacy? Never   Do you have problems taking your medications as prescribed? Yes   Do you have any problems affording any of  your prescribed medications? No   Do you have problems obtaining/receiving your medications? Yes   Living Arrangements house   Does your caregiver provide all the help you need? Yes   Are you currently feeling confused? No   Are you currently having problems thinking? No   Are you currently having memory problems? No     Tarah Bermudez, RN, [facility], CMSRN  RN Transition Navigator  [phone]

## 2018-11-13 NOTE — SUBJECTIVE & OBJECTIVE
Past Medical History:   Diagnosis Date    MAGO (acute kidney injury) 10/26/2018    Anemia of chronic disease 2018    Arthritis     Cataract     Diabetes mellitus     GERD (gastroesophageal reflux disease)     Heart attack     Hypertension     Joint pain     NSTEMI (non-ST elevated myocardial infarction) 2015    S/P coronary artery stent placement     2 stents 2015    Seizures     Stroke     about 3-4 with light right leg weakness       Past Surgical History:   Procedure Laterality Date    CATARACT EXTRACTION Bilateral 2017    COLONOSCOPY  2007    COLONOSCOPY N/A 2018    Procedure: COLONOSCOPY;  Surgeon: Huy New MD;  Location: [facility];  Service: Endoscopy;  Laterality: N/A;    COLONOSCOPY N/A 2018    Performed by Huy New [facility] at [facility] ENDO    CORONARY ANGIOPLASTY WITH STENT PLACEMENT      EXTRACTION-CATARACT-IOL Left 2017    Per[facility] by [facility]inney [facility] at [facility] OR    EXTRACTION-CATARACT-IOL Right 2017    [facility] OR    HEART CATH-LEFT N/A 2015    Performed by Franki [facility]rigez MD at [facility] CATH LAB    REPAIR- RETINA ANTERIOR Right 2017    [facility] by [facility] at [facility] OR    TUBAL LIGATION         Review of patient's allergies indicates:  No Known Allergies    Current Facility-Administered Medications on File Prior to Encounter   Medication    [] 0.9%  NaCl infusion    [] 0.9%  NaCl infusion    [COMPLETED] clopidogrel tablet 300 mg    [COMPLETED] diphenhydrAMINE capsule 50 mg    [DISCONTINUED] 0.9%  NaCl infusion    [DISCONTINUED] acetaminophen tablet 650 mg    [DISCONTINUED] albuterol-ipratropium 2.5 mg-0.5 mg/3 mL nebulizer solution 3 mL    [DISCONTINUED] aspirin EC tablet 81 mg    [DISCONTINUED] atorvastatin tablet 80 mg    [DISCONTINUED] brimonidine 0.15 % OPTH DROP ophthalmic solution 1 drop    [DISCONTINUED] carvedilol tablet 25  mg    [DISCONTINUED] cetirizine tablet 10 mg    [DISCONTINUED] clopidogrel tablet 75 mg    [DISCONTINUED] dextrose 50% injection 12.5 g    [DISCONTINUED] dextrose 50% injection 25 g    [DISCONTINUED] dorzolamide 2 % ophthalmic solution 1 drop    [DISCONTINUED] fentaNYL injection    [DISCONTINUED] FLUoxetine capsule 20 mg    [DISCONTINUED] glucagon (human recombinant) injection 1 mg    [DISCONTINUED] glucose chewable tablet 16 g    [DISCONTINUED] glucose chewable tablet 24 g    [DISCONTINUED] heparin (porcine) injection 5,000 Units    [DISCONTINUED] heparin (porcine) injection    [DISCONTINUED] hydrALAZINE injection 10 mg    [DISCONTINUED] HYDROcodone-acetaminophen 5-325 mg per tablet 1 tablet    [DISCONTINUED] insulin aspart U-100 pen 0-5 Units    [DISCONTINUED] insulin aspart U-100 pen 14 Units    [DISCONTINUED] insulin detemir U-100 pen 35 Units    [DISCONTINUED] latanoprost 0.005 % ophthalmic solution 1 drop    [DISCONTINUED] levETIRAcetam tablet 1,000 mg    [DISCONTINUED] lidocaine (PF) 20 mg/ml (2%) injection    [DISCONTINUED] losartan tablet 50 mg    [DISCONTINUED] midazolam injection    [DISCONTINUED] nitroGLYCERIN SL tablet 0.4 mg    [DISCONTINUED] ondansetron injection 4 mg    [DISCONTINUED] polyvinyl alcohol (artificial tears) 1.4 % ophthalmic solution 1 drop    [DISCONTINUED] promethazine (PHENERGAN) 6.25 mg in dextrose 5 % 50 mL IVPB    [DISCONTINUED] senna-docusate 8.6-50 mg per tablet 1 tablet    [DISCONTINUED] sodium chloride 0.9% flush 5 mL    [DISCONTINUED] timolol maleate 0.5% ophthalmic solution 1 drop    [DISCONTINUED] visipaque 320 iodixanol     Current Outpatient Medications on File Prior to Encounter   Medication Sig    acetaminophen (TYLENOL) 325 MG tablet Take 2 tablets (650 mg total) by mouth every 6 (six) hours as needed for Pain.    ammonium lactate 12 % Crea Apply to the body at least twice daily    aspirin (ECOTRIN) 81 MG EC tablet Take 1 tablet (81 mg  total) by mouth once daily.    atorvastatin (LIPITOR) 80 MG tablet TAKE ONE TABLET BY MOUTH ONCE DAILY    betamethasone dipropionate (DIPROLENE) 0.05 % cream Apply topically 2 (two) times daily.    blood sugar diagnostic Strp 1 strip by Misc.(Non-Drug; Combo Route) route 3 (three) times daily.    brimonidine 0.2% (ALPHAGAN) 0.2 % Drop Place 1 drop into the right eye 3 (three) times daily.    carvedilol (COREG) 25 MG tablet Take 1 tablet (25 mg total) by mouth 2 (two) times daily with meals.    cetirizine (ZYRTEC) 10 MG tablet Take 1 tablet (10 mg total) by mouth every morning.    clopidogrel (PLAVIX) 75 mg tablet Take 1 tablet (75 mg total) by mouth once daily.    dorzolamide-timolol 2-0.5% (COSOPT) 22.3-6.8 mg/mL ophthalmic solution Place 1 drop into the right eye 2 (two) times daily.    fish oil-omega-3 fatty acids 300-1,000 mg capsule Take 2 capsules (2 g total) by mouth once daily.    FLUoxetine (PROZAC) 20 MG capsule Take 1 capsule (20 mg total) by mouth once daily.    furosemide (LASIX) 20 MG tablet Take 1 tablet (20 mg total) by mouth 2 (two) times daily.    gabapentin (NEURONTIN) 300 MG capsule Take 1 capsule (300 mg total) by mouth 3 (three) times daily.    insulin aspart U-100 (NOVOLOG) 100 unit/mL InPn pen Inject 14 Units into the skin 3 (three) times daily with meals.    lancets (ONETOUCH ULTRASOFT LANCETS) Misc 1 application by Misc.(Non-Drug; Combo Route) route 3 (three) times daily.    latanoprost (XALATAN) 0.005 % ophthalmic solution Place 1 drop into the right eye once daily.    LEVEMIR FLEXTOUCH U-100 INSULN 100 unit/mL (3 mL) InPn pen Inject 35 Units into the skin every evening.    levetiracetam (KEPPRA) 500 MG Tab Take 2 tablets (1,000 mg total) by mouth 2 (two) times daily. 2 tablets twice per day    losartan (COZAAR) 50 MG tablet TAKE ONE TABLET BY MOUTH IN THE EVENING    metFORMIN (GLUCOPHAGE) 500 MG tablet Take 1 tablet (500 mg total) by mouth 2 (two) times daily with  "meals. 1/2 tablet BID if you develop loose stools that don't improve in 1week    nystatin (MYCOSTATIN) powder Apply topically 2 (two) times daily.    pen needle, diabetic 32 gauge x 5/32" Ndle 1 each by Misc.(Non-Drug; Combo Route) route 2 (two) times daily with meals.    polyvinyl alcohol, artificial tears, (LIQUIFILM TEARS) 1.4 % ophthalmic solution 1 drop as needed.    triamcinolone acetonide 0.1% (KENALOG) 0.1 % cream Apply topically 2 (two) times daily. Thin amount to the skin on the arms and legs     Family History     Problem Relation (Age of Onset)    Cancer Father    Diabetes Daughter, Sister    Hypertension Daughter    No Known Problems Mother    Stomach cancer Father        Tobacco Use    Smoking status: Never Smoker    Smokeless tobacco: Never Used   Substance and Sexual Activity    Alcohol use: No    Drug use: No    Sexual activity: No     Birth control/protection: Surgical     Review of Systems   Constitution: Positive for malaise/fatigue. Negative for diaphoresis.   HENT: Negative.    Eyes: Negative.    Cardiovascular: Positive for chest pain and dyspnea on exertion. Negative for irregular heartbeat, leg swelling, near-syncope, orthopnea, palpitations, paroxysmal nocturnal dyspnea and syncope.   Respiratory: Negative.  Negative for cough and shortness of breath.    Endocrine: Negative.    Hematologic/Lymphatic: Negative.    Musculoskeletal: Negative.    Gastrointestinal: Negative.    Genitourinary: Negative.    Neurological: Negative.    Psychiatric/Behavioral: Negative.    Allergic/Immunologic: Negative.      Objective:     Vital Signs (Most Recent):  Temp: 96.9 °F (36.1 °C) (11/13/18 0813)  Pulse: 78 (11/13/18 0813)  Resp: 18 (11/13/18 0813)  BP: (!) 185/87 (11/13/18 0813)  SpO2: 95 % (11/13/18 0745) Vital Signs (24h Range):  Temp:  [96 °F (35.6 °C)-97.4 °F (36.3 °C)] 96.9 °F (36.1 °C)  Pulse:  [68-81] 78  Resp:  [14-20] 18  SpO2:  [94 %-99 %] 95 %  BP: (123-185)/(66-93) 185/87     Weight: " 82.4 kg (181 lb 10.5 oz)  Body mass index is 29.32 kg/m².    SpO2: 95 %  O2 Device (Oxygen Therapy): room air      Intake/Output Summary (Last 24 hours) at 11/13/2018 0833  Last data filed at 11/13/2018 0613  Gross per 24 hour   Intake 525 ml   Output 0 ml   Net 525 ml       Lines/Drains/Airways     Peripheral Intravenous Line                 Peripheral IV - Single Lumen 11/10/18 2100 Left Antecubital 2 days                Physical Exam   Constitutional: She is oriented to person, place, and time. No distress.   HENT:   Head: Normocephalic and atraumatic.   Eyes: Right eye exhibits no discharge. Left eye exhibits no discharge.   Neck: No JVD present.   Cardiovascular: Normal rate and regular rhythm. Exam reveals no gallop and no friction rub.   No murmur heard.  Pulmonary/Chest: Effort normal and breath sounds normal.   Abdominal: Soft. Bowel sounds are normal.   Musculoskeletal: She exhibits no edema.   Neurological: She is alert and oriented to person, place, and time.   Skin: Skin is warm and dry. She is not diaphoretic.   Psychiatric: She has a normal mood and affect. Her behavior is normal. Judgment and thought content normal.       Significant Labs:   BMP:   Recent Labs   Lab 11/12/18  0524 11/13/18  0242 11/13/18  0510    142* 138*    140 140   K 4.2 4.3 4.5    103 104   CO2 29 31* 30*   BUN 25* 24* 23*   CREATININE 1.2 1.3 1.2   CALCIUM 9.0 8.8 8.7   MG 2.1  --   --    , CMP   Recent Labs   Lab 11/12/18  0524 11/13/18  0242 11/13/18  0510    140 140   K 4.2 4.3 4.5    103 104   CO2 29 31* 30*    142* 138*   BUN 25* 24* 23*   CREATININE 1.2 1.3 1.2   CALCIUM 9.0 8.8 8.7   PROT 6.0 5.7*  --    ALBUMIN 1.9* 1.8*  --    BILITOT 0.3 0.3  --    ALKPHOS 86 81  --    AST 35 28  --    ALT 28 22  --    ANIONGAP 9 6* 6*   ESTGFRAFRICA 57.2* 52* 57*   EGFRNONAA 49.6* 45* 50*   , CBC   Recent Labs   Lab 11/12/18  0524 11/13/18  0239   WBC 4.98 4.30   HGB 8.9* 8.3*   HCT 28.8* 27.0*     313   , Lipid Panel No results for input(s): CHOL, HDL, LDLCALC, TRIG, CHOLHDL in the last 48 hours., Troponin No results for input(s): TROPONINI in the last 48 hours. and All pertinent lab results from the last 24 hours have been reviewed.    Significant Imaging: Echocardiogram:   2D echo with color flow doppler:   Results for orders placed or performed during the hospital encounter of 06/29/18   2D Echo w/ Color Flow Doppler   Result Value Ref Range    QEF 55 55 - 65    Diastolic Dysfunction Yes (A)     Est. PA Systolic Pressure 7.16

## 2018-11-13 NOTE — NURSING
Patient arrived to recovery cath lab. Patient drowsy but able to follow commands with no complaints of pain. Vital signs stable, and RIGHT RADIAL site is clean, dry, and intact with +2 palpable pulses. Vasc Band in place with 12cc of air.  Site noted to be soft with no bleeding or hematoma. Patient informed of fall risk precaution. Patient verbalized understanding. Bed locked and in lowest position, with side rails up x2. Will continue to monitor.

## 2018-11-13 NOTE — NURSING
VN rounding note: Pt resting comfortably in bed. Daughters at bedside assisting pt with meal. NAD noted. Allowed time for questions. Will cont to be available and intervene as needed.

## 2018-11-13 NOTE — NURSING
"Pt's BS 35. VN cued into's pt room to check on pt. Pt asymptomatic and stated she feels "dizzy and hungry". Floor nurse Alana at bedside. Will cont to be available as needed.   "

## 2018-11-13 NOTE — H&P
[facility]  Cardiology  History and Physical     Patient Name: Lilly Frye  MRN: [id number]  Admission Date: 11/12/2018  Code Status: Prior   Attending Provider: Percy Rodriguez MD   Primary Care Physician: Bud Carmen MD  Principal Problem:<principal problem not specified>    Patient information was obtained from patient, past medical records and ER records.     Subjective:     Chief Complaint:  Chest Pain     HPI:  Lilly Frye is a 58 y.o. female with CAD s/p 2 stents in 2015, CVA, seizures, HTN, HFpEF, uncontrolled DM2 (A1C >14), neuropathy, depression. Presented to [facility] ED with complaint of CP x 1 day and worsening SOB x 2 days. Had recent admit 2 weeks ago for same complaint, at that time was found to have NSTEMI believed to be 2/2 demand (uncontrolled HTN). She was ordered an outpatient stress test but has not yet undergone testing. CP was described as substernal, L sided, 10/10, sharp, and occurring while at rest. Patient states she can only walk a few feet before becoming SOB. Patient has worsened orthopnea from 2 pillows to 3 pillows and worsened leg swelling. Patient has not been adherent to low sodium diet, often eating ramen noodles or other processed foods. Patient did try to increase oral lasix at home to 80mg BID, but without relief. EKG showed no acute ST elevation but trop was elevated at 0.126, and BNP of 944. She also was exhibiting symptoms of volume overload and was admitted for CHF exacerbation. She was diuresed with IV lasix and her creatinine elevated from 1.5 to 1.6. She was still slightly volume overloaded so Lasix was continued. Her CP never resolved so cardiology was consulted and decided to do an LHC which showed 90% stenosis in proximal and ostial LAD. They recommended transfer for a PCI and to continue asa and lipitor 80 and to give a loading dose of plavix 300 and start plavix 75mg daily. She was transferred to [facility] for revascularization.        Past Medical History:   Diagnosis Date    MAGO (acute kidney injury) 10/26/2018    Anemia of chronic disease 2018    Arthritis     Cataract     Diabetes mellitus     GERD (gastroesophageal reflux disease)     Heart attack     Hypertension     Joint pain     NSTEMI (non-ST elevated myocardial infarction) 2015    S/P coronary artery stent placement     2 stents 2015    Seizures     Stroke     about 3-4 with light right leg weakness       Past Surgical History:   Procedure Laterality Date    CATARACT EXTRACTION Bilateral 2017    COLONOSCOPY  2007    COLONOSCOPY N/A 2018    Procedure: COLONOSCOPY;  Surgeon: Huy New MD;  Location: [facility];  Service: Endoscopy;  Laterality: N/A;    COLONOSCOPY N/A 2018    Performed by Huy New MD at [facility] ENDO    CORONARY ANGIOPLASTY WITH STENT PLACEMENT      EXTRACTION-CATARACT-IOL Left 2017    Performed by Jonny Mckinney MD at [facility] OR    EXTRACTION-CATARACT-IOL Right 2017    Performed by Jonny Mckinney MD at [facility] OR    HEART CATH-LEFT N/A 2015    Performed by Franki Rodrigez MD at [facility] CATH LAB    REPAIR- RETINA ANTERIOR Right 2017    Performed by Jonny Mckinney MD at [facility] OR    TUBAL LIGATION         Review of patient's allergies indicates:  No Known Allergies    Current Facility-Administered Medications on File Prior to Encounter   Medication    [] 0.9%  NaCl infusion    [] 0.9%  NaCl infusion    [COMPLETED] clopidogrel tablet 300 mg    [COMPLETED] diphenhydrAMINE capsule 50 mg    [DISCONTINUED] 0.9%  NaCl infusion    [DISCONTINUED] acetaminophen tablet 650 mg    [DISCONTINUED] albuterol-ipratropium 2.5 mg-0.5 mg/3 mL nebulizer solution 3 mL    [DISCONTINUED] aspirin EC tablet 81 mg    [DISCONTINUED] atorvastatin tablet 80 mg    [DISCONTINUED] brimonidine 0.15 % OPTH DROP ophthalmic solution 1 drop    [DISCONTINUED] carvedilol  tablet 25 mg    [DISCONTINUED] cetirizine tablet 10 mg    [DISCONTINUED] clopidogrel tablet 75 mg    [DISCONTINUED] dextrose 50% injection 12.5 g    [DISCONTINUED] dextrose 50% injection 25 g    [DISCONTINUED] dorzolamide 2 % ophthalmic solution 1 drop    [DISCONTINUED] fentaNYL injection    [DISCONTINUED] FLUoxetine capsule 20 mg    [DISCONTINUED] glucagon (human recombinant) injection 1 mg    [DISCONTINUED] glucose chewable tablet 16 g    [DISCONTINUED] glucose chewable tablet 24 g    [DISCONTINUED] heparin (porcine) injection 5,000 Units    [DISCONTINUED] heparin (porcine) injection    [DISCONTINUED] hydrALAZINE injection 10 mg    [DISCONTINUED] HYDROcodone-acetaminophen 5-325 mg per tablet 1 tablet    [DISCONTINUED] insulin aspart U-100 pen 0-5 Units    [DISCONTINUED] insulin aspart U-100 pen 14 Units    [DISCONTINUED] insulin detemir U-100 pen 35 Units    [DISCONTINUED] latanoprost 0.005 % ophthalmic solution 1 drop    [DISCONTINUED] levETIRAcetam tablet 1,000 mg    [DISCONTINUED] lidocaine (PF) 20 mg/ml (2%) injection    [DISCONTINUED] losartan tablet 50 mg    [DISCONTINUED] midazolam injection    [DISCONTINUED] nitroGLYCERIN SL tablet 0.4 mg    [DISCONTINUED] ondansetron injection 4 mg    [DISCONTINUED] polyvinyl alcohol (artificial tears) 1.4 % ophthalmic solution 1 drop    [DISCONTINUED] promethazine (PHENERGAN) 6.25 mg in dextrose 5 % 50 mL IVPB    [DISCONTINUED] senna-docusate 8.6-50 mg per tablet 1 tablet    [DISCONTINUED] sodium chloride 0.9% flush 5 mL    [DISCONTINUED] timolol maleate 0.5% ophthalmic solution 1 drop    [DISCONTINUED] visipaque 320 iodixanol     Current Outpatient Medications on File Prior to Encounter   Medication Sig    acetaminophen (TYLENOL) 325 MG tablet Take 2 tablets (650 mg total) by mouth every 6 (six) hours as needed for Pain.    ammonium lactate 12 % Crea Apply to the body at least twice daily    aspirin (ECOTRIN) 81 MG EC tablet Take 1  tablet (81 mg total) by mouth once daily.    atorvastatin (LIPITOR) 80 MG tablet TAKE ONE TABLET BY MOUTH ONCE DAILY    betamethasone dipropionate (DIPROLENE) 0.05 % cream Apply topically 2 (two) times daily.    blood sugar diagnostic Strp 1 strip by Misc.(Non-Drug; Combo Route) route 3 (three) times daily.    brimonidine 0.2% (ALPHAGAN) 0.2 % Drop Place 1 drop into the right eye 3 (three) times daily.    carvedilol (COREG) 25 MG tablet Take 1 tablet (25 mg total) by mouth 2 (two) times daily with meals.    cetirizine (ZYRTEC) 10 MG tablet Take 1 tablet (10 mg total) by mouth every morning.    clopidogrel (PLAVIX) 75 mg tablet Take 1 tablet (75 mg total) by mouth once daily.    dorzolamide-timolol 2-0.5% (COSOPT) 22.3-6.8 mg/mL ophthalmic solution Place 1 drop into the right eye 2 (two) times daily.    fish oil-omega-3 fatty acids 300-1,000 mg capsule Take 2 capsules (2 g total) by mouth once daily.    FLUoxetine (PROZAC) 20 MG capsule Take 1 capsule (20 mg total) by mouth once daily.    furosemide (LASIX) 20 MG tablet Take 1 tablet (20 mg total) by mouth 2 (two) times daily.    gabapentin (NEURONTIN) 300 MG capsule Take 1 capsule (300 mg total) by mouth 3 (three) times daily.    insulin aspart U-100 (NOVOLOG) 100 unit/mL InPn pen Inject 14 Units into the skin 3 (three) times daily with meals.    lancets (ONETOUCH ULTRASOFT LANCETS) Misc 1 application by Misc.(Non-Drug; Combo Route) route 3 (three) times daily.    latanoprost (XALATAN) 0.005 % ophthalmic solution Place 1 drop into the right eye once daily.    LEVEMIR FLEXTOUCH U-100 INSULN 100 unit/mL (3 mL) InPn pen Inject 35 Units into the skin every evening.    levetiracetam (KEPPRA) 500 MG Tab Take 2 tablets (1,000 mg total) by mouth 2 (two) times daily. 2 tablets twice per day    losartan (COZAAR) 50 MG tablet TAKE ONE TABLET BY MOUTH IN THE EVENING    metFORMIN (GLUCOPHAGE) 500 MG tablet Take 1 tablet (500 mg total) by mouth 2 (two) times  "daily with meals. 1/2 tablet BID if you develop loose stools that don't improve in 1week    nystatin (MYCOSTATIN) powder Apply topically 2 (two) times daily.    pen needle, diabetic 32 gauge x 5/32" Ndle 1 each by Misc.(Non-Drug; Combo Route) route 2 (two) times daily with meals.    polyvinyl alcohol, artificial tears, (LIQUIFILM TEARS) 1.4 % ophthalmic solution 1 drop as needed.    triamcinolone acetonide 0.1% (KENALOG) 0.1 % cream Apply topically 2 (two) times daily. Thin amount to the skin on the arms and legs     Family History     Problem Relation (Age of Onset)    Cancer Father    Diabetes Daughter, Sister    Hypertension Daughter    No Known Problems Mother    Stomach cancer Father        Tobacco Use    Smoking status: Never Smoker    Smokeless tobacco: Never Used   Substance and Sexual Activity    Alcohol use: No    Drug use: No    Sexual activity: No     Birth control/protection: Surgical     Review of Systems   Constitution: Positive for malaise/fatigue. Negative for diaphoresis.   HENT: Negative.    Eyes: Negative.    Cardiovascular: Positive for chest pain and dyspnea on exertion. Negative for irregular heartbeat, leg swelling, near-syncope, orthopnea, palpitations, paroxysmal nocturnal dyspnea and syncope.   Respiratory: Negative.  Negative for cough and shortness of breath.    Endocrine: Negative.    Hematologic/Lymphatic: Negative.    Musculoskeletal: Negative.    Gastrointestinal: Negative.    Genitourinary: Negative.    Neurological: Negative.    Psychiatric/Behavioral: Negative.    Allergic/Immunologic: Negative.      Objective:     Vital Signs (Most Recent):  Temp: 96.9 °F (36.1 °C) (11/13/18 0813)  Pulse: 78 (11/13/18 0813)  Resp: 18 (11/13/18 0813)  BP: (!) 185/87 (11/13/18 0813)  SpO2: 95 % (11/13/18 0745) Vital Signs (24h Range):  Temp:  [96 °F (35.6 °C)-97.4 °F (36.3 °C)] 96.9 °F (36.1 °C)  Pulse:  [68-81] 78  Resp:  [14-20] 18  SpO2:  [94 %-99 %] 95 %  BP: (123-185)/(66-93) 185/87 "     Weight: 82.4 kg (181 lb 10.5 oz)  Body mass index is 29.32 kg/m².    SpO2: 95 %  O2 Device (Oxygen Therapy): room air      Intake/Output Summary (Last 24 hours) at 11/13/2018 0833  Last data filed at 11/13/2018 0613  Gross per 24 hour   Intake 525 ml   Output 0 ml   Net 525 ml       Lines/Drains/Airways     Peripheral Intravenous Line                 Peripheral IV - Single Lumen 11/10/18 2100 Left Antecubital 2 days                Physical Exam   Constitutional: She is oriented to person, place, and time. No distress.   HENT:   Head: Normocephalic and atraumatic.   Eyes: Right eye exhibits no discharge. Left eye exhibits no discharge.   Neck: No JVD present.   Cardiovascular: Normal rate and regular rhythm. Exam reveals no gallop and no friction rub.   No murmur heard.  Pulmonary/Chest: Effort normal and breath sounds normal.   Abdominal: Soft. Bowel sounds are normal.   Musculoskeletal: She exhibits no edema.   Neurological: She is alert and oriented to person, place, and time.   Skin: Skin is warm and dry. She is not diaphoretic.   Psychiatric: She has a normal mood and affect. Her behavior is normal. Judgment and thought content normal.       Significant Labs:   BMP:   Recent Labs   Lab 11/12/18  0524 11/13/18  0242 11/13/18  0510    142* 138*    140 140   K 4.2 4.3 4.5    103 104   CO2 29 31* 30*   BUN 25* 24* 23*   CREATININE 1.2 1.3 1.2   CALCIUM 9.0 8.8 8.7   MG 2.1  --   --    , CMP   Recent Labs   Lab 11/12/18  0524 11/13/18  0242 11/13/18  0510    140 140   K 4.2 4.3 4.5    103 104   CO2 29 31* 30*    142* 138*   BUN 25* 24* 23*   CREATININE 1.2 1.3 1.2   CALCIUM 9.0 8.8 8.7   PROT 6.0 5.7*  --    ALBUMIN 1.9* 1.8*  --    BILITOT 0.3 0.3  --    ALKPHOS 86 81  --    AST 35 28  --    ALT 28 22  --    ANIONGAP 9 6* 6*   ESTGFRAFRICA 57.2* 52* 57*   EGFRNONAA 49.6* 45* 50*   , CBC   Recent Labs   Lab 11/12/18  0524 11/13/18  0239   WBC 4.98 4.30   HGB 8.9* 8.3*   HCT  28.8* 27.0*    313   , Lipid Panel No results for input(s): CHOL, HDL, LDLCALC, TRIG, CHOLHDL in the last 48 hours., Troponin No results for input(s): TROPONINI in the last 48 hours. and All pertinent lab results from the last 24 hours have been reviewed.    Significant Imaging: Echocardiogram:   2D echo with color flow doppler:   Results for orders placed or performed during the hospital encounter of 06/29/18   2D Echo w/ Color Flow Doppler   Result Value Ref Range    QEF 55 55 - 65    Diastolic Dysfunction Yes (A)     Est. PA Systolic Pressure 7.16      Assessment and Plan:     CHF (congestive heart failure)    HFpEF  Presented to [facility] with ADHF in setting of dietary sodium indiscretion  Euvolemic at present   Dietician to be consulted this admission   BB, ARB continued       Coronary artery disease due to lipid rich plaque.  MATTEO LAD/LCx 12/2015    S/p MATTEO to LAD and LCx in 2015  Now with 90% prox and os LAD stenosis, transferred from [facility] for revascularization   On asa, Plavix, statin, BB, ARB- continued  NPO for revasc today  Cr 1.2  LVEF normal per outside report     Needs better diabetes management A1c 14       Hypertension    SBP 130s-150s  Continue BB and ARB, will up titrate for goal SBP <130/80     Type 2 diabetes mellitus with complication    HA1c >14  Accuchecks AC/HS with moderate dose SSI          VTE Risk Mitigation (From admission, onward)        Ordered     IP VTE HIGH RISK PATIENT  Once      11/13/18 0832     Place ADAMA hose  Until discontinued      11/13/18 0832     Place sequential compression device  Until discontinued      11/13/18 0832          Phu Zamora NP  Cardiology   [facility]

## 2018-11-14 VITALS
WEIGHT: 181.69 LBS | SYSTOLIC BLOOD PRESSURE: 141 MMHG | DIASTOLIC BLOOD PRESSURE: 67 MMHG | OXYGEN SATURATION: 97 % | HEART RATE: 79 BPM | RESPIRATION RATE: 18 BRPM | TEMPERATURE: 96 F | BODY MASS INDEX: 29.2 KG/M2 | HEIGHT: 66 IN

## 2018-11-14 LAB
ALBUMIN SERPL BCP-MCNC: 1.8 G/DL
ALP SERPL-CCNC: 78 U/L
ALT SERPL W/O P-5'-P-CCNC: 21 U/L
ANION GAP SERPL CALC-SCNC: 5 MMOL/L
AST SERPL-CCNC: 23 U/L
BASOPHILS # BLD AUTO: 0.02 K/UL
BASOPHILS NFR BLD: 0.4 %
BILIRUB SERPL-MCNC: 0.3 MG/DL
BUN SERPL-MCNC: 23 MG/DL
CALCIUM SERPL-MCNC: 8.6 MG/DL
CHLORIDE SERPL-SCNC: 105 MMOL/L
CO2 SERPL-SCNC: 28 MMOL/L
CREAT SERPL-MCNC: 1.3 MG/DL
DIFFERENTIAL METHOD: ABNORMAL
EOSINOPHIL # BLD AUTO: 0.2 K/UL
EOSINOPHIL NFR BLD: 3.1 %
ERYTHROCYTE [DISTWIDTH] IN BLOOD BY AUTOMATED COUNT: 13.2 %
EST. GFR  (AFRICAN AMERICAN): 52 ML/MIN/1.73 M^2
EST. GFR  (NON AFRICAN AMERICAN): 45 ML/MIN/1.73 M^2
GLUCOSE SERPL-MCNC: 152 MG/DL
HCT VFR BLD AUTO: 26.6 %
HGB BLD-MCNC: 8.2 G/DL
LYMPHOCYTES # BLD AUTO: 1.6 K/UL
LYMPHOCYTES NFR BLD: 32.6 %
MCH RBC QN AUTO: 28.7 PG
MCHC RBC AUTO-ENTMCNC: 30.8 G/DL
MCV RBC AUTO: 93 FL
MONOCYTES # BLD AUTO: 0.6 K/UL
MONOCYTES NFR BLD: 12.7 %
NEUTROPHILS # BLD AUTO: 2.5 K/UL
NEUTROPHILS NFR BLD: 50.8 %
PLATELET # BLD AUTO: 310 K/UL
PMV BLD AUTO: 10.6 FL
POCT GLUCOSE: 170 MG/DL (ref 70–110)
POCT GLUCOSE: 61 MG/DL (ref 70–110)
POCT GLUCOSE: 98 MG/DL (ref 70–110)
POTASSIUM SERPL-SCNC: 4.8 MMOL/L
PROT SERPL-MCNC: 5.5 G/DL
RBC # BLD AUTO: 2.86 M/UL
SODIUM SERPL-SCNC: 138 MMOL/L
WBC # BLD AUTO: 4.88 K/UL

## 2018-11-14 PROCEDURE — 25000003 PHARM REV CODE 250: Performed by: NURSE PRACTITIONER

## 2018-11-14 PROCEDURE — 99239 HOSP IP/OBS DSCHRG MGMT >30: CPT | Mod: ,,, | Performed by: NURSE PRACTITIONER

## 2018-11-14 PROCEDURE — 63600175 PHARM REV CODE 636 W HCPCS: Performed by: INTERNAL MEDICINE

## 2018-11-14 PROCEDURE — 85025 COMPLETE CBC W/AUTO DIFF WBC: CPT

## 2018-11-14 PROCEDURE — 25000003 PHARM REV CODE 250: Performed by: INTERNAL MEDICINE

## 2018-11-14 PROCEDURE — 80053 COMPREHEN METABOLIC PANEL: CPT

## 2018-11-14 PROCEDURE — 36415 COLL VENOUS BLD VENIPUNCTURE: CPT

## 2018-11-14 RX ORDER — CLOPIDOGREL BISULFATE 75 MG/1
75 TABLET ORAL DAILY
Qty: 30 TABLET | Refills: 11 | Status: SHIPPED | OUTPATIENT
Start: 2018-11-15 | End: 2018-12-06 | Stop reason: SDUPTHER

## 2018-11-14 RX ORDER — INSULIN PUMP SYRINGE, 3 ML
EACH MISCELLANEOUS
Qty: 1 EACH | Refills: 0 | Status: SHIPPED | OUTPATIENT
Start: 2018-11-14 | End: 2018-11-20

## 2018-11-14 RX ORDER — ASPIRIN 81 MG/1
81 TABLET ORAL DAILY
Refills: 0 | COMMUNITY
Start: 2018-11-15 | End: 2020-01-01

## 2018-11-14 RX ORDER — NITROGLYCERIN 0.4 MG/1
0.4 TABLET SUBLINGUAL EVERY 5 MIN PRN
Qty: 25 TABLET | Refills: 11 | Status: ON HOLD | OUTPATIENT
Start: 2018-11-14 | End: 2019-01-01 | Stop reason: HOSPADM

## 2018-11-14 RX ADMIN — ATORVASTATIN CALCIUM 80 MG: 40 TABLET, FILM COATED ORAL at 08:11

## 2018-11-14 RX ADMIN — CARVEDILOL 25 MG: 25 TABLET, FILM COATED ORAL at 08:11

## 2018-11-14 RX ADMIN — FLUOXETINE 20 MG: 20 CAPSULE ORAL at 08:11

## 2018-11-14 RX ADMIN — INSULIN ASPART 14 UNITS: 100 INJECTION, SOLUTION INTRAVENOUS; SUBCUTANEOUS at 08:11

## 2018-11-14 RX ADMIN — LEVETIRACETAM 1000 MG: 500 TABLET ORAL at 09:11

## 2018-11-14 RX ADMIN — ASPIRIN 81 MG: 81 TABLET, COATED ORAL at 08:11

## 2018-11-14 RX ADMIN — INSULIN ASPART 2 UNITS: 100 INJECTION, SOLUTION INTRAVENOUS; SUBCUTANEOUS at 08:11

## 2018-11-14 RX ADMIN — Medication 16 G: at 11:11

## 2018-11-14 RX ADMIN — CLOPIDOGREL BISULFATE 75 MG: 75 TABLET ORAL at 08:11

## 2018-11-14 NOTE — NURSING
"VN cued into pt's room. Pt has discharge orders and VN informed pt that VN will be working on her discharge. Pt stated daughters would be there "at some point today". VN inspected R radial and site is secured with gauze and coban. Pt denies any pain and states that she "feels better". Allowed time for questions. Pt inquired about not having a glucometer at home and would like to have one to check her sugar. VN informed pt she will notify CM and MD about this. Will cue back into pt's room as needed.       "

## 2018-11-14 NOTE — DISCHARGE SUMMARY
[facility]  Cardiology  Discharge Summary      Patient Name: Lilly Frye  MRN: [id number]  Admission Date: 11/12/2018  Hospital Length of Stay: 2 days  Discharge Date and Time: 11/14/2018 11:58 AM  Attending Physician: Dayna att. providers found    Discharging Provider: Phu Zamora NP  Primary Care Physician: Bud Carmen MD    HPI:   Lilly Frye is a 58 y.o. female with CAD s/p 2 stents in 2015, CVA, seizures, HTN, HFpEF, uncontrolled DM2 (A1C >14), neuropathy, depression. Presented to [facility] ED with complaint of CP x 1 day and worsening SOB x 2 days. Had recent admit 2 weeks ago for same complaint, at that time was found to have NSTEMI believed to be 2/2 demand (uncontrolled HTN). She was ordered an outpatient stress test but has not yet undergone testing. CP was described as substernal, L sided, 10/10, sharp, and occurring while at rest. Patient states she can only walk a few feet before becoming SOB. Patient has worsened orthopnea from 2 pillows to 3 pillows and worsened leg swelling. Patient has not been adherent to low sodium diet, often eating ramen noodles or other processed foods. Patient did try to increase oral lasix at home to 80mg BID, but without relief. EKG showed no acute ST elevation but trop was elevated at 0.126, and BNP of 944. She also was exhibiting symptoms of volume overload and was admitted for CHF exacerbation. She was diuresed with IV lasix and her creatinine elevated from 1.5 to 1.6. She was still slightly volume overloaded so Lasix was continued. Her CP never resolved so cardiology was consulted and decided to do an LHC which showed 90% stenosis in proximal and ostial LAD. They recommended transfer for a PCI and to continue asa and lipitor 80 and to give a loading dose of plavix 300 and start plavix 75mg daily. She was transferred to [facility] for revascularization.       Procedure(s) (LRB):  IVUS, Coronary  ANGIOGRAM, CORONARY ARTERY (N/A)  Left heart  cath (Left)     Indwelling Lines/Drains at time of discharge:  Lines/Drains/Airways          None          Hospital Course:  11/13/2018 NPO for [facility] today. Hemodynamically stable. Cr 1.2.   See report for details  Prox LAD lesion is 95% stenosed. This is the culprit lesion. The lesion is < 10 mm long. Lesion shape is eccentric. Lesion contour is smooth. The lesion was not previously treated.  STENT RESOLUTE INTGRTY 4.00X15  11/14/2018 Patient was recovered overnight without incident and found to be tolerating her ADLs at baseline. Patient appropriate for discharge. Compliance with medication was discussed with particular attention paid to importance of  DAPT for one year without interruption. The risk of abrupt stent occlusion and MI with early discontinuation was specifically stressed. Verbalized understanding and will follow up in the cardiology clinic in 2-3 weeks     Consults:   Consults (From admission, onward)        Status Ordering Provider     IP consult to case management  Once     Provider:  (Not yet assigned)    Completed RONAD TOLEDO          Significant Diagnostic Studies: Labs:   BMP:   Recent Labs   Lab 11/13/18  0242 11/13/18  0510 11/14/18  0516   * 138* 152*    140 138   K 4.3 4.5 4.8    104 105   CO2 31* 30* 28   BUN 24* 23* 23*   CREATININE 1.3 1.2 1.3   CALCIUM 8.8 8.7 8.6*   , CMP   Recent Labs   Lab 11/13/18  0242 11/13/18  0510 11/14/18  0516    140 138   K 4.3 4.5 4.8    104 105   CO2 31* 30* 28   * 138* 152*   BUN 24* 23* 23*   CREATININE 1.3 1.2 1.3   CALCIUM 8.8 8.7 8.6*   PROT 5.7*  --  5.5*   ALBUMIN 1.8*  --  1.8*   BILITOT 0.3  --  0.3   ALKPHOS 81  --  78   AST 28  --  23   ALT 22  --  21   ANIONGAP 6* 6* 5*   ESTGFRAFRICA 52* 57* 52*   EGFRNONAA 45* 50* 45*   , CBC   Recent Labs   Lab 11/13/18  0239 11/14/18  0516   WBC 4.30 4.88   HGB 8.3* 8.2*   HCT 27.0* 26.6*    310   , INR   Lab Results   Component Value Date    INR 1.0 11/06/2018     INR 0.9 10/24/2018    INR 0.9 08/09/2018   , Lipid Panel   Lab Results   Component Value Date    CHOL 251 (H) 06/13/2018    HDL 70 06/13/2018    LDLCALC 159.2 (H) 06/13/2018    TRIG 109 06/13/2018    CHOLHDL 27.9 06/13/2018   , A1C:   Recent Labs   Lab 06/13/18  0700 08/10/18  0224 10/24/18  1046   HGBA1C >14.0* >14.0* 13.2*    and All labs within the past 24 hours have been reviewed  Cardiac Graphics: Echocardiogram:   2D echo with color flow doppler:   Results for orders placed or performed during the hospital encounter of 06/29/18   2D Echo w/ Color Flow Doppler   Result Value Ref Range    QEF 55 55 - 65    Diastolic Dysfunction Yes (A)     Est. PA Systolic Pressure 7.16        Pending Diagnostic Studies:     None          Final Active Diagnoses:    Diagnosis Date Noted POA    PRINCIPAL PROBLEM:  Coronary artery disease due to lipid rich plaque.  MATTEO LAD/LCx 12/2015 [I25.10, I25.83] 01/08/2016 Yes    CHF (congestive heart failure) [I50.9] 11/12/2018 Yes    Hypertension [I10] 08/27/2014 Yes     Chronic    Type 2 diabetes mellitus with complication [E11.8] 08/27/2014 Yes      Problems Resolved During this Admission:     No new Assessment & Plan notes have been filed under this hospital service since the last note was generated.  Service: Cardiology      Discharged Condition: good    Disposition: Home or Self Care    Follow Up:  Follow-up Information     Bud Carmen MD On 11/20/2018.    Specialty:  Internal Medicine  Why:  Dr. Lamas  will assess for Dr. Carmen at  Time: 9:30  Contact information:  [address] [address]  [phone]             Kenyon Lopez MD On 12/6/2018.    Specialty:  Cardiology  Why:  time: 9:30  Contact information:  [address]  [place] [address]  [phone]                 Patient Instructions:      Diet diabetic     Diet Cardiac     Notify your health care provider if you experience any of the following:  redness, tenderness, or signs of infection  (pain, swelling, redness, odor or green/yellow discharge around incision site)     Notify your health care provider if you experience any of the following:  severe uncontrolled pain     Notify your health care provider if you experience any of the following:  difficulty breathing or increased cough     Notify your health care provider if you experience any of the following:  persistent dizziness, light-headedness, or visual disturbances     Activity as tolerated     Medications:  Reconciled Home Medications:      Medication List      START taking these medications    blood-glucose meter kit  Commonly known as:  FREESTYLE SYSTEM KIT  Use as instructed     nitroGLYCERIN 0.4 MG SL tablet  Commonly known as:  NITROSTAT  Place 1 tablet (0.4 mg total) under the tongue every 5 (five) minutes as needed for Chest pain.        CONTINUE taking these medications    acetaminophen 325 MG tablet  Commonly known as:  TYLENOL  Take 2 tablets (650 mg total) by mouth every 6 (six) hours as needed for Pain.     ammonium lactate 12 % Crea  Apply to the body at least twice daily     aspirin 81 MG EC tablet  Commonly known as:  ECOTRIN  Take 1 tablet (81 mg total) by mouth once daily.  Start taking on:  11/15/2018     atorvastatin 80 MG tablet  Commonly known as:  LIPITOR  TAKE ONE TABLET BY MOUTH ONCE DAILY     betamethasone dipropionate 0.05 % cream  Commonly known as:  DIPROLENE  Apply topically 2 (two) times daily.     blood sugar diagnostic Strp  1 strip by Misc.(Non-Drug; Combo Route) route 3 (three) times daily.     brimonidine 0.2% 0.2 % Drop  Commonly known as:  ALPHAGAN  Place 1 drop into the right eye 3 (three) times daily.     carvedilol 25 MG tablet  Commonly known as:  COREG  Take 1 tablet (25 mg total) by mouth 2 (two) times daily with meals.     cetirizine 10 MG tablet  Commonly known as:  ZyrTEC  Take 1 tablet (10 mg total) by mouth every morning.     clopidogrel 75 mg tablet  Commonly known as:  PLAVIX  Take 1 tablet (75  "mg total) by mouth once daily.  Start taking on:  11/15/2018     dorzolamide-timolol 2-0.5% 22.3-6.8 mg/mL ophthalmic solution  Commonly known as:  COSOPT  Place 1 drop into the right eye 2 (two) times daily.     fish oil-omega-3 fatty acids 300-1,000 mg capsule  Take 2 capsules (2 g total) by mouth once daily.     FLUoxetine 20 MG capsule  Commonly known as:  PROZAC  Take 1 capsule (20 mg total) by mouth once daily.     furosemide 20 MG tablet  Commonly known as:  LASIX  Take 1 tablet (20 mg total) by mouth 2 (two) times daily.     gabapentin 300 MG capsule  Commonly known as:  NEURONTIN  Take 1 capsule (300 mg total) by mouth 3 (three) times daily.     insulin aspart U-100 100 unit/mL Inpn pen  Commonly known as:  NovoLOG  Inject 14 Units into the skin 3 (three) times daily with meals.     lancets Misc  Commonly known as:  ONETOUCH ULTRASOFT LANCETS  1 application by Misc.(Non-Drug; Combo Route) route 3 (three) times daily.     latanoprost 0.005 % ophthalmic solution  Commonly known as:  XALATAN  Place 1 drop into the right eye once daily.     LEVEMIR FLEXTOUCH U-100 INSULN 100 unit/mL (3 mL) Inpn pen  Generic drug:  insulin detemir U-100  Inject 35 Units into the skin every evening.     levETIRAcetam 500 MG Tab  Commonly known as:  KEPPRA  Take 2 tablets (1,000 mg total) by mouth 2 (two) times daily. 2 tablets twice per day     losartan 50 MG tablet  Commonly known as:  COZAAR  TAKE ONE TABLET BY MOUTH IN THE EVENING     metFORMIN 500 MG tablet  Commonly known as:  GLUCOPHAGE  Take 1 tablet (500 mg total) by mouth 2 (two) times daily with meals. 1/2 tablet BID if you develop loose stools that don't improve in 1week     nystatin powder  Commonly known as:  MYCOSTATIN  Apply topically 2 (two) times daily.     pen needle, diabetic 32 gauge x 5/32" Ndle  1 each by Misc.(Non-Drug; Combo Route) route 2 (two) times daily with meals.     polyvinyl alcohol (artificial tears) 1.4 % ophthalmic solution  Commonly known as:  " LIQUIFILM TEARS  1 drop as needed.     triamcinolone acetonide 0.1% 0.1 % cream  Commonly known as:  KENALOG  Apply topically 2 (two) times daily. Thin amount to the skin on the arms and legs        ASK your doctor about these medications    ciprofloxacin HCl 500 MG tablet  Commonly known as:  CIPRO  Take 1 tablet (500 mg total) by mouth 2 (two) times daily. for 10 days            Time spent on the discharge of patient: 45 minutes    Phu Zamora NP  Cardiology  [facility]

## 2018-11-14 NOTE — PLAN OF CARE
Problem: Diabetes, Type 2 (Adult)  Intervention: Optimize Glycemic Control  Last glucose test result was 302. Patient received 4 units of PRN insulin aspart will continue to monitor

## 2018-11-14 NOTE — PLAN OF CARE
Problem: Patient Care Overview  Goal: Plan of Care Review  Outcome: Revised  Plan of care reviewed with patient. Patients verbalizes full understanding . Patient in bed, HOB elevated, call light and bedside table within reach, no signs and symptoms of distress noted. Patient instructed to call for assistance before getting out of bed. Patient verbalizes full understanding. Will continue to monitor

## 2018-11-14 NOTE — NURSING
IV and tele box removed. PT CBG 98 at time of dc. Reinforced education on diet, medications and follow up care that VN provided.. No acute distress noted. Off unit in wheelchair with transport.

## 2018-11-14 NOTE — PLAN OF CARE
Pt being d/c home once daughter arrives. Tn informed pt of PCC but pt declined and prefers to follow up with her PCP and Cardiologist in [place]. [place] informed SOFI Dutta NP of Cards appt 12/6/18 with DR. Do in [place]. TN attempted to schedule PCP and was informed DR. New will see pt for DR. Carmen. Tn placed information an AVS. Michaelle, floor nurse asked Tn about getting pt a new glucometer and Tn requested she notify team to send prescription to pt's pharmacy. Pt has d/c folder, brochure, and card and encouraged to call for any further needs/concerns.     11/14/18 1031   Final Note   Assessment Type Final Discharge Note   Anticipated Discharge Disposition Home   What phone number can be called within the next 1-3 days to see how you are doing after discharge? [phone]   Hospital Follow Up  Appt(s) scheduled? Yes   Discharge plans and expectations educations in teach back method with documentation complete? Yes   Right Care Referral Info   Post Acute Recommendation No Care

## 2018-11-14 NOTE — NURSING
VN reviewed discharge instructions with pt and daughters. Reviewed follow-up appointments, new/current medications, diet, and importance of medication compliance. Reviewed HF education with pt. Reviewed s/s, risk factors, treatment plan, self-management, and when to seek medical attention. Also provided incision site care, s/s of infection, and when to seek medical attention. Allowed time for questions. All questions answered. Patient and daughters verbalized complete understanding.     Discharge instructions complete. Bedside nurse notified. Sugar to be rechecked prior to pt leaving. Pt and daughters aware.

## 2018-11-15 ENCOUNTER — PATIENT OUTREACH (OUTPATIENT)
Dept: ADMINISTRATIVE | Facility: CLINIC | Age: 58
End: 2018-11-15

## 2018-11-15 NOTE — PATIENT INSTRUCTIONS
Heart Disease Education    The heart beats 60 to 100 times per minute, 24 hours a day. This equals almost 1000,000 times a day. It pumps blood with oxygen and nutrients to the tissues and organs of the body. But the heart is a muscle and needs its own supply of blood. Blood flow to the heart is supplied by the coronary arteries. Coronary artery disease (atherosclerosis) is a result of cholesterol, saturated fat, and calcium deposits (plaques) that build up inside the walls. This causes inflammation within the coronary arteries. These plaques narrow the artery and reduce blood flow to the heart muscle. The reduction in blood flow to the heart muscle decreases oxygen supply to the heart. If the narrowing is significant enough, the oxygen supply to one or more regions of the heart can be temporarily or permanently shut down. This can cause chest pain, and possibly death of heart tissue (heart attack).  Types of chest pain  Angina is the name for pain in the heart muscle. Angina is a warning sign of serious heart disease. When untreated it can lead to a heart attack, also known as acute myocardial infarction, or AMI. Angina occurs when there is not enough blood and oxygen flowing to the heart for the amount of work it is doing. This most often happens during physical exertion, when the heart is working hardest. It is usually relieved by rest or nitroglycerin. Angina may also occur after a large meal when extra blood is sent to the digestive organs and less goes to the heart. In the case of advanced or unstable heart disease, angina can occur at rest or awaken you from sleep. Angina usually lasts from a few minutes up to 20 minutes or more. When treated early, the effects of angina can be reversed without permanent damage to the heart. Angina is a serious condition and needs to be evaluated by a medical professional immediately.  There are two types of angina -- stable and unstable:  · Stable angina usually occurs  with a predictable level of activity. Being stable, its character, severity, and occurrence do not change much over time. It usually starts with activity, and resolves with rest or taking your medicine as instructed by your doctor. The symptoms usually do not last long.  · Unstable angina changes or gets worse over time. It is different from whatever you are used to. It may feel different or worse, begin without cause, occur with exercise or exertion, wake you up from sleep, and last longer. It may not respond in the same way as it does when you take your usual medicines for an attack. This type of angina can be a warning sign of an impending heart attack.     A heart attack is usually the result of a blood clot that suddenly forms in a coronary artery that has been narrowed with plaque. When this occurs, blood flow may be cut off to a part of the heart muscle, causing the cells to die. This weakens the pumping action of the heart, which affects the delivery of blood to all the other organs in the body including the brain. This damage is not reversible. However, early treatment can limit the amount of damage.  The pain you feel with angina and a heart attack may have a similar quality. However, it is usually different in intensity and duration. Here are some typical descriptions of a heart attack:  · It is most often experienced as a squeezing, crushing, pressure-like sensation in the center of the chest.  · It is sometimes described as something heavy sitting on my chest.  · It may feel more like a bad case of indigestion.  · The pain may spread from the chest to the arm, shoulder, throat or jaw.  · Sometimes the pain is not felt in the chest at all, but only in the arm, shoulder, throat or jaw.  · There may also be nausea, vomiting, dizziness or light-headedness, sweating and trouble breathing.  · Palpitations, or your heart beating rapidly  · A new, irregular heart beat  · Unexplained weakness  You may not be  "able to tell the difference between "bad" angina and a heart attack at home. Seek help if your symptoms are different than usual. Do not be in denial or just try to "tough it out."  Call 911  This is the fastest and safest way to get to the emergency department. The paramedics can also start treatment on the way to the hospital, saving valuable time for your heart.  · If the angina gets worse, if it continues, or if it stops and returns, call 911 immediately. Do not delay. You may be having a heart attack.  · After you call 911, take a second tablet or spray unless instructed otherwise. When repeating doses, sit down if possible, because it can make you feel lightheaded or dizzy. Wait another 5 minutes. If the angina still does not go away, take a third tablet or spray. Do not take more than 3 tablets or sprays within 15 minutes. Stay on the phone with 911 for further instruction.  · Your healthcare provider may give you slightly different instructions than those above. If so, follow them carefully.  Do not wait until symptoms become severe to call 911.  Other reasons to call 911 include:  · Trouble breathing  · Feeling lightheaded, faint, or dizzy  · Rapid heart beat  · Slower than usual heart rate compared to your normal  · Angina with weakness, dizziness, fainting, heavy sweating, nausea, or vomiting  · Extreme drowsiness, confusion  · Weakness of an arm or leg or one side of the face  · Difficulty with speech or vision  When to seek medical care  Remember, the signs and symptoms of a heart attack are not always like they are on TV. Sometimes they are not so obvious. You may only feel weak, or just not right. If it is not clear or if you have any doubt, call for advice.  · Seek help if there is a change in the type of pain, if it feels different, or if your symptoms are mild.  · Do not drive yourself. Have someone else drive you. If no one can drive, call 911.  · Do not delay. Fast diagnosis and treatment can " "prevent or limit the amount of heart damage during a heart attack.  · Do not go to your doctor's office or a clinic as they may not be able to provide all the testing and treatment required for this condition.  · If your doctor has given you medicine to take when symptoms occur, take them but don't delay getting help trying to locate medicines.  What happens in the emergency department  The emergency department is connected to your local emergency medical system (EMS) through 911. That's why during a cardiac emergency, calling 911 is the fastest way to get help. The goal of the emergency department is to rapidly screen, evaluate, and treat people.  Once you are there, an electrocardiogram (ECG or heart tracing) will be done. Blood samples may be taken to look for the presence of heart enzymes that leak from damaged heart cells and show if a heart attack is occurring. You will often be evaluated by a heart specialist (cardiologist) who decides the best course of action. In the case of severe angina or early heart attack, and depending on the circumstances, powerful "clot busting" medicines can be used to dissolve blood clots in the coronary artery. In other cases, you may be taken to a cardiac catheterization lab. Here, a tiny balloon-tipped catheter is advanced through blood vessels to the heart. There the balloon is inflated pushing open the blood vessel restoring blood flow.  Risk factors for heart disease  Risk factors for heart disease are a combination of genetic and lifestyle. Many risk factors work by either directly or indirectly damaging the blood vessels of the heart, or by increasing the risk of forming blood or cholesterol clots, which then clog up and block the arteries.     Examples of physical lifestyle risk factors:  · Cigarette smoking  · High blood pressure  · High blood cholesterol  · Use of stimulant drugs such as cocaine, crack, and amphetamines  · Eating a high-fat, high-cholesterol " meal  · Diabetes   · Obesity which increases risk for diabetes and high blood pressure  · Lack of regular physical activity     Examples of emotional lifestyle factors:  · Chronic high stress levels release stress hormones. These raise blood pressure and cholesterol level and makes blood clot more easily.  · Held-in anger, hostile or cynical attitude  · Social and emotional isolation, lack of intimacy  · Loss of relationship  · Depression  Other factors that increase the risk of heart attack that you cannot control :  · Age. The older you get beyond 40, the greater is your risk of significant coronary artery disease.  · Gender. More men than women get heart disease; but once past menopause, women who are not taking estrogen replacement have the same risk as men for a heart attack.  · Family history. If your mother, father, brother or sister has coronary artery disease, your risk of having it is higher than a person your age without this family history.  What can you do to decrease your risk  To reduce your risk of heart disease:  · Get regular checkups with your doctor.  · Take your medicines for blood pressure, cholesterol or diabetes as directed.  · Watch your diet. Eat a heart healthy diet choosing fresh foods, less salt, cholesterol, and fat  · Stop smoking. Get help if needed.  · Get regular exercise.  · Manage stress.  · Carry a list of medicines and doses in your wallet.  Date Last Reviewed: 12/30/2015  © 3374-4689 [employer]. [address] [place] [address], [place] [address]. All rights reserved. This information is not intended as a substitute for professional medical care. Always follow your healthcare professional's instructions.

## 2018-11-20 PROBLEM — Z00.00 HEALTHCARE MAINTENANCE: Status: ACTIVE | Noted: 2018-11-20

## 2018-11-20 PROBLEM — I16.0 HYPERTENSIVE URGENCY: Status: RESOLVED | Noted: 2018-08-09 | Resolved: 2018-11-20

## 2018-11-20 PROBLEM — I16.1 HYPERTENSIVE EMERGENCY: Status: RESOLVED | Noted: 2018-10-24 | Resolved: 2018-11-20

## 2018-11-20 PROBLEM — R82.90 FOUL SMELLING URINE: Status: RESOLVED | Noted: 2017-12-05 | Resolved: 2018-11-20

## 2018-12-06 PROBLEM — Z91.199 HISTORY OF NONCOMPLIANCE WITH MEDICAL TREATMENT: Status: ACTIVE | Noted: 2018-12-06

## 2018-12-06 PROBLEM — N18.30 CKD (CHRONIC KIDNEY DISEASE) STAGE 3, GFR 30-59 ML/MIN: Status: ACTIVE | Noted: 2018-12-06

## 2018-12-06 PROBLEM — I51.7 LEFT ATRIAL ENLARGEMENT: Status: ACTIVE | Noted: 2018-12-06

## 2018-12-06 PROBLEM — I50.30 (HFPEF) HEART FAILURE WITH PRESERVED EJECTION FRACTION: Status: ACTIVE | Noted: 2018-12-06

## 2018-12-06 PROBLEM — I50.9 ACUTE ON CHRONIC CONGESTIVE HEART FAILURE: Status: RESOLVED | Noted: 2018-11-07 | Resolved: 2018-12-06

## 2018-12-06 PROBLEM — I50.9 CHF (CONGESTIVE HEART FAILURE): Status: RESOLVED | Noted: 2018-11-12 | Resolved: 2018-12-06

## 2018-12-30 ENCOUNTER — TELEPHONE (OUTPATIENT)
Dept: CARDIOLOGY | Facility: HOSPITAL | Age: 58
End: 2018-12-30

## 2018-12-31 NOTE — TELEPHONE ENCOUNTER
Rangel Lopez       We took care of her at [place] in 11/2018 for LAD PCI  She was discharged on aspirin + plavix      A prescription was sent to [facility] on [address] in [place] electronically on 11/14/2018. I copied it from the discharge medication list. Regarding the phone calls I do not see any calls documented in Epic encounters asking for a plavix prescription.         She had 30 tabs with 11 refills. There is an order number + a confirmation number. The word discontinued is mentioned only because we renewed an existing prescription.                       Thanks for solving the problem when she saw you          Percy Rodirguez

## 2019-01-01 ENCOUNTER — DOCUMENTATION ONLY (OUTPATIENT)
Dept: OPHTHALMOLOGY | Facility: CLINIC | Age: 59
End: 2019-01-01

## 2019-01-01 ENCOUNTER — NURSE TRIAGE (OUTPATIENT)
Dept: ADMINISTRATIVE | Facility: CLINIC | Age: 59
End: 2019-01-01

## 2019-01-11 ENCOUNTER — HOSPITAL ENCOUNTER (INPATIENT)
Facility: HOSPITAL | Age: 59
LOS: 3 days | Discharge: HOME | DRG: 305 | End: 2019-01-14
Attending: STUDENT IN AN ORGANIZED HEALTH CARE EDUCATION/TRAINING PROGRAM | Admitting: STUDENT IN AN ORGANIZED HEALTH CARE EDUCATION/TRAINING PROGRAM
Payer: MEDICARE

## 2019-01-11 DIAGNOSIS — R07.9 CHEST PAIN, UNSPECIFIED TYPE: ICD-10-CM

## 2019-01-11 DIAGNOSIS — I10 ESSENTIAL HYPERTENSION: Primary | Chronic | ICD-10-CM

## 2019-01-11 DIAGNOSIS — I16.0 HYPERTENSIVE URGENCY: ICD-10-CM

## 2019-01-11 DIAGNOSIS — I50.30 (HFPEF) HEART FAILURE WITH PRESERVED EJECTION FRACTION: ICD-10-CM

## 2019-01-11 DIAGNOSIS — R10.32 LEFT LOWER QUADRANT PAIN: ICD-10-CM

## 2019-01-11 DIAGNOSIS — E78.5 HYPERLIPIDEMIA, UNSPECIFIED HYPERLIPIDEMIA TYPE: ICD-10-CM

## 2019-01-11 DIAGNOSIS — R41.0 CONFUSION: ICD-10-CM

## 2019-01-11 DIAGNOSIS — R10.9 ABDOMINAL PAIN, UNSPECIFIED ABDOMINAL LOCATION: ICD-10-CM

## 2019-01-11 DIAGNOSIS — I25.83 CORONARY ARTERY DISEASE DUE TO LIPID RICH PLAQUE: ICD-10-CM

## 2019-01-11 DIAGNOSIS — I25.10 CORONARY ARTERY DISEASE DUE TO LIPID RICH PLAQUE: ICD-10-CM

## 2019-01-11 DIAGNOSIS — N39.0 URINARY TRACT INFECTION WITHOUT HEMATURIA, SITE UNSPECIFIED: ICD-10-CM

## 2019-01-11 DIAGNOSIS — I25.10 CAD (CORONARY ARTERY DISEASE): ICD-10-CM

## 2019-01-11 DIAGNOSIS — E11.8 TYPE 2 DIABETES MELLITUS WITH COMPLICATION, WITH LONG-TERM CURRENT USE OF INSULIN: ICD-10-CM

## 2019-01-11 DIAGNOSIS — E11.65 TYPE 2 DIABETES MELLITUS WITH HYPERGLYCEMIA, WITHOUT LONG-TERM CURRENT USE OF INSULIN: Chronic | ICD-10-CM

## 2019-01-11 DIAGNOSIS — N18.30 CKD (CHRONIC KIDNEY DISEASE) STAGE 3, GFR 30-59 ML/MIN: Chronic | ICD-10-CM

## 2019-01-11 DIAGNOSIS — I21.4 NSTEMI (NON-ST ELEVATED MYOCARDIAL INFARCTION): ICD-10-CM

## 2019-01-11 DIAGNOSIS — R07.9 CHEST PAIN: ICD-10-CM

## 2019-01-11 DIAGNOSIS — Z79.4 TYPE 2 DIABETES MELLITUS WITH COMPLICATION, WITH LONG-TERM CURRENT USE OF INSULIN: ICD-10-CM

## 2019-01-11 LAB
ANION GAP SERPL CALC-SCNC: 9 MMOL/L
APTT BLDCRRT: 32 SEC
APTT BLDCRRT: 41.7 SEC
BUN SERPL-MCNC: 18 MG/DL
CALCIUM SERPL-MCNC: 8.7 MG/DL
CHLORIDE SERPL-SCNC: 98 MMOL/L
CK SERPL-CCNC: 136 U/L
CO2 SERPL-SCNC: 30 MMOL/L
CREAT SERPL-MCNC: 1.4 MG/DL
EST. GFR  (AFRICAN AMERICAN): 47 ML/MIN/1.73 M^2
EST. GFR  (NON AFRICAN AMERICAN): 41 ML/MIN/1.73 M^2
GLUCOSE SERPL-MCNC: 381 MG/DL
POCT GLUCOSE: 250 MG/DL (ref 70–110)
POCT GLUCOSE: 321 MG/DL (ref 70–110)
POTASSIUM SERPL-SCNC: 3.6 MMOL/L
SODIUM SERPL-SCNC: 137 MMOL/L
TROPONIN I SERPL DL<=0.01 NG/ML-MCNC: 0.21 NG/ML

## 2019-01-11 PROCEDURE — 93005 ELECTROCARDIOGRAM TRACING: CPT

## 2019-01-11 PROCEDURE — 80048 BASIC METABOLIC PNL TOTAL CA: CPT

## 2019-01-11 PROCEDURE — 63600175 PHARM REV CODE 636 W HCPCS

## 2019-01-11 PROCEDURE — 99222 1ST HOSP IP/OBS MODERATE 55: CPT | Mod: ,,, | Performed by: STUDENT IN AN ORGANIZED HEALTH CARE EDUCATION/TRAINING PROGRAM

## 2019-01-11 PROCEDURE — 20000000 HC ICU ROOM

## 2019-01-11 PROCEDURE — 25000003 PHARM REV CODE 250: Performed by: NURSE PRACTITIONER

## 2019-01-11 PROCEDURE — 63600175 PHARM REV CODE 636 W HCPCS: Performed by: NURSE PRACTITIONER

## 2019-01-11 PROCEDURE — S5571 INSULIN DISPOS PEN 3 ML: HCPCS | Performed by: NURSE PRACTITIONER

## 2019-01-11 PROCEDURE — 93010 EKG 12-LEAD: ICD-10-PCS | Mod: ,,, | Performed by: INTERNAL MEDICINE

## 2019-01-11 PROCEDURE — 36415 COLL VENOUS BLD VENIPUNCTURE: CPT

## 2019-01-11 PROCEDURE — 99222 PR INITIAL HOSPITAL CARE,LEVL II: ICD-10-PCS | Mod: ,,, | Performed by: STUDENT IN AN ORGANIZED HEALTH CARE EDUCATION/TRAINING PROGRAM

## 2019-01-11 PROCEDURE — 85730 THROMBOPLASTIN TIME PARTIAL: CPT | Mod: 91

## 2019-01-11 PROCEDURE — 25000003 PHARM REV CODE 250: Performed by: STUDENT IN AN ORGANIZED HEALTH CARE EDUCATION/TRAINING PROGRAM

## 2019-01-11 PROCEDURE — 82550 ASSAY OF CK (CPK): CPT

## 2019-01-11 PROCEDURE — 94761 N-INVAS EAR/PLS OXIMETRY MLT: CPT

## 2019-01-11 PROCEDURE — 84484 ASSAY OF TROPONIN QUANT: CPT

## 2019-01-11 PROCEDURE — 93010 ELECTROCARDIOGRAM REPORT: CPT | Mod: ,,, | Performed by: INTERNAL MEDICINE

## 2019-01-11 PROCEDURE — 27000221 HC OXYGEN, UP TO 24 HOURS

## 2019-01-11 RX ORDER — HYDRALAZINE HYDROCHLORIDE 20 MG/ML
10 INJECTION INTRAMUSCULAR; INTRAVENOUS EVERY 8 HOURS PRN
Status: DISCONTINUED | OUTPATIENT
Start: 2019-01-12 | End: 2019-01-14 | Stop reason: HOSPADM

## 2019-01-11 RX ORDER — HYDROXYZINE HYDROCHLORIDE 25 MG/1
25 TABLET, FILM COATED ORAL 3 TIMES DAILY PRN
Status: DISCONTINUED | OUTPATIENT
Start: 2019-01-11 | End: 2019-01-14 | Stop reason: HOSPADM

## 2019-01-11 RX ORDER — HYDRALAZINE HYDROCHLORIDE 20 MG/ML
INJECTION INTRAMUSCULAR; INTRAVENOUS
Status: COMPLETED
Start: 2019-01-11 | End: 2019-01-11

## 2019-01-11 RX ORDER — POLYETHYLENE GLYCOL 3350 17 G/17G
17 POWDER, FOR SOLUTION ORAL 2 TIMES DAILY PRN
Status: DISCONTINUED | OUTPATIENT
Start: 2019-01-11 | End: 2019-01-14 | Stop reason: HOSPADM

## 2019-01-11 RX ORDER — ONDANSETRON 8 MG/1
8 TABLET, ORALLY DISINTEGRATING ORAL EVERY 6 HOURS PRN
Status: DISCONTINUED | OUTPATIENT
Start: 2019-01-11 | End: 2019-01-14 | Stop reason: HOSPADM

## 2019-01-11 RX ORDER — HYDRALAZINE HYDROCHLORIDE 20 MG/ML
10 INJECTION INTRAMUSCULAR; INTRAVENOUS ONCE
Status: DISCONTINUED | OUTPATIENT
Start: 2019-01-11 | End: 2019-01-11

## 2019-01-11 RX ORDER — ISOSORBIDE MONONITRATE 30 MG/1
30 TABLET, EXTENDED RELEASE ORAL DAILY
Status: DISCONTINUED | OUTPATIENT
Start: 2019-01-12 | End: 2019-01-14 | Stop reason: HOSPADM

## 2019-01-11 RX ORDER — SIMETHICONE 125 MG
125 TABLET,CHEWABLE ORAL EVERY 6 HOURS PRN
Status: DISCONTINUED | OUTPATIENT
Start: 2019-01-11 | End: 2019-01-14 | Stop reason: HOSPADM

## 2019-01-11 RX ORDER — HYDROCODONE BITARTRATE AND ACETAMINOPHEN 5; 325 MG/1; MG/1
1 TABLET ORAL EVERY 6 HOURS PRN
Status: DISCONTINUED | OUTPATIENT
Start: 2019-01-11 | End: 2019-01-14 | Stop reason: HOSPADM

## 2019-01-11 RX ORDER — CLOPIDOGREL BISULFATE 75 MG/1
75 TABLET ORAL DAILY
Status: DISCONTINUED | OUTPATIENT
Start: 2019-01-12 | End: 2019-01-14 | Stop reason: HOSPADM

## 2019-01-11 RX ORDER — HEPARIN SODIUM,PORCINE/D5W 25000/250
12 INTRAVENOUS SOLUTION INTRAVENOUS CONTINUOUS
Status: DISCONTINUED | OUTPATIENT
Start: 2019-01-11 | End: 2019-01-12

## 2019-01-11 RX ORDER — FLUOXETINE HYDROCHLORIDE 20 MG/1
20 CAPSULE ORAL DAILY
Status: DISCONTINUED | OUTPATIENT
Start: 2019-01-12 | End: 2019-01-14 | Stop reason: HOSPADM

## 2019-01-11 RX ORDER — ACETAMINOPHEN 325 MG/1
650 TABLET ORAL EVERY 6 HOURS PRN
Status: DISCONTINUED | OUTPATIENT
Start: 2019-01-11 | End: 2019-01-14 | Stop reason: HOSPADM

## 2019-01-11 RX ORDER — IBUPROFEN 200 MG
16 TABLET ORAL
Status: DISCONTINUED | OUTPATIENT
Start: 2019-01-11 | End: 2019-01-14 | Stop reason: HOSPADM

## 2019-01-11 RX ORDER — ATORVASTATIN CALCIUM 40 MG/1
80 TABLET, FILM COATED ORAL DAILY
Status: DISCONTINUED | OUTPATIENT
Start: 2019-01-12 | End: 2019-01-14 | Stop reason: HOSPADM

## 2019-01-11 RX ORDER — NITROGLYCERIN 0.4 MG/1
0.4 TABLET SUBLINGUAL EVERY 5 MIN PRN
Status: DISCONTINUED | OUTPATIENT
Start: 2019-01-11 | End: 2019-01-14 | Stop reason: HOSPADM

## 2019-01-11 RX ORDER — MAG HYDROX/ALUMINUM HYD/SIMETH 200-200-20
30 SUSPENSION, ORAL (FINAL DOSE FORM) ORAL EVERY 6 HOURS PRN
Status: DISCONTINUED | OUTPATIENT
Start: 2019-01-11 | End: 2019-01-14 | Stop reason: HOSPADM

## 2019-01-11 RX ORDER — ASPIRIN 81 MG/1
81 TABLET ORAL DAILY
Status: DISCONTINUED | OUTPATIENT
Start: 2019-01-12 | End: 2019-01-14 | Stop reason: HOSPADM

## 2019-01-11 RX ORDER — GLUCAGON 1 MG
1 KIT INJECTION
Status: DISCONTINUED | OUTPATIENT
Start: 2019-01-11 | End: 2019-01-14 | Stop reason: HOSPADM

## 2019-01-11 RX ORDER — GABAPENTIN 300 MG/1
300 CAPSULE ORAL 3 TIMES DAILY
Status: DISCONTINUED | OUTPATIENT
Start: 2019-01-11 | End: 2019-01-13

## 2019-01-11 RX ORDER — INSULIN ASPART 100 [IU]/ML
1-10 INJECTION, SOLUTION INTRAVENOUS; SUBCUTANEOUS
Status: DISCONTINUED | OUTPATIENT
Start: 2019-01-11 | End: 2019-01-14 | Stop reason: HOSPADM

## 2019-01-11 RX ORDER — LEVETIRACETAM 500 MG/1
1000 TABLET ORAL 2 TIMES DAILY
Status: DISCONTINUED | OUTPATIENT
Start: 2019-01-11 | End: 2019-01-14 | Stop reason: HOSPADM

## 2019-01-11 RX ORDER — IBUPROFEN 200 MG
24 TABLET ORAL
Status: DISCONTINUED | OUTPATIENT
Start: 2019-01-11 | End: 2019-01-14 | Stop reason: HOSPADM

## 2019-01-11 RX ORDER — CARVEDILOL 25 MG/1
25 TABLET ORAL 2 TIMES DAILY WITH MEALS
Status: DISCONTINUED | OUTPATIENT
Start: 2019-01-11 | End: 2019-01-12

## 2019-01-11 RX ORDER — NITROGLYCERIN 20 MG/100ML
10 INJECTION INTRAVENOUS CONTINUOUS
Status: DISCONTINUED | OUTPATIENT
Start: 2019-01-11 | End: 2019-01-11

## 2019-01-11 RX ORDER — RAMELTEON 8 MG/1
8 TABLET ORAL NIGHTLY PRN
Status: DISCONTINUED | OUTPATIENT
Start: 2019-01-11 | End: 2019-01-14 | Stop reason: HOSPADM

## 2019-01-11 RX ADMIN — HYDRALAZINE HYDROCHLORIDE 10 MG: 20 INJECTION INTRAMUSCULAR; INTRAVENOUS at 03:01

## 2019-01-11 RX ADMIN — ACETAMINOPHEN 650 MG: 325 TABLET ORAL at 04:01

## 2019-01-11 RX ADMIN — LIDOCAINE HYDROCHLORIDE: 20 SOLUTION ORAL; TOPICAL at 09:01

## 2019-01-11 RX ADMIN — NITROGLYCERIN 10 MCG/MIN: 20 INJECTION INTRAVENOUS at 04:01

## 2019-01-11 RX ADMIN — SIMETHICONE 125 MG: 125 TABLET, CHEWABLE ORAL at 07:01

## 2019-01-11 RX ADMIN — ONDANSETRON 8 MG: 8 TABLET, ORALLY DISINTEGRATING ORAL at 08:01

## 2019-01-11 RX ADMIN — CARVEDILOL 25 MG: 25 TABLET, FILM COATED ORAL at 04:01

## 2019-01-11 RX ADMIN — GABAPENTIN 300 MG: 300 CAPSULE ORAL at 09:01

## 2019-01-11 RX ADMIN — LEVETIRACETAM 1000 MG: 500 TABLET ORAL at 09:01

## 2019-01-11 RX ADMIN — INSULIN DETEMIR 20 UNITS: 100 INJECTION, SOLUTION SUBCUTANEOUS at 09:01

## 2019-01-11 RX ADMIN — ALUMINUM HYDROXIDE, MAGNESIUM HYDROXIDE, AND SIMETHICONE 30 ML: 200; 200; 20 SUSPENSION ORAL at 04:01

## 2019-01-11 RX ADMIN — INSULIN ASPART 8 UNITS: 100 INJECTION, SOLUTION INTRAVENOUS; SUBCUTANEOUS at 05:01

## 2019-01-11 RX ADMIN — HEPARIN SODIUM AND DEXTROSE 12 UNITS/KG/HR: 10000; 5 INJECTION INTRAVENOUS at 04:01

## 2019-01-11 NOTE — HPI
57yo female with history of CAD s/p LAD/LCX PCI 2015 s/p LAD MATTEO 11/2018, HTN, HLP, DMII, CKD stage III, CVA, HFpEF and MR who was transferred from [facility] with elevated troponin and HTN urgency. She complains of chest pain for the past 3-4 days with minimal exertion. The pain would relieve with rest but would recur with resumed activity. She reports being awoken at 2am this AM with chest pain worse than previous episodes. She described the pain as a midsternal chest tightness that was associated with SOB, nausea and vomiting but was not associated with radiation or diaphoresis. Her symptoms are not similar to her previous symptoms prior to her PCI and at that time was more SOB. She reports compliance with her medication regimen however her family member at the bedside contradicates this and reports missed medications for unknown reasons

## 2019-01-11 NOTE — NURSING
Patient arrived to ICU. All monitors places. Patient c/o abdominal pain. FAIZAN Lim notified of patient's arrival. Will await orders.  Xiomara Stokes RN

## 2019-01-11 NOTE — HOSPITAL COURSE
1/11/2019 Presented to the ER at [facility] with complaints of chest pain. Initial troponin .247 with repeat troponin .226. EKG NSR with PVCs T wave abnormality to lateral leads similar to previous EKG. BMP with K+ 4.5 BUN 19 creatinine 1.6. BP elevated at 212/128 upon admission. Started on IV Heparin drip as well IV Tridil drip with trend down of BP to 160s/70s. Transferred to [facility] for further evaluation. Admitted to [facility] with BP elevated at 188/112 and recurrent chest pain with no Tridil infusing. STAT EKG and labs pending. Resumed IV Tridil drip   1/12/19:  Chest pain is resolved. Continues to c/o left sided abdominal pain. Unable to tolerate most of her dinner. Norco help a little with her pain. No BM since hospital admit.  Trop and ecg stable.     1/13/19: Pt is sleepy this am. Trop is now down trending, HTN is better controlled. Pt Rn staff, pt is not as alert as previous day. >500cc of urine in am bladder. Agree for in/out stevenson and to collect UA.  Tentative to discharge home later today...     ** pt is now see dead people per RN staff. Give cipro for UTI. Unknown reason y pt is delirious.**     01/14/2019 Patient hemodynamically stable. Mental status improved, appropriate for discharge.

## 2019-01-11 NOTE — SUBJECTIVE & OBJECTIVE
Past Medical History:   Diagnosis Date    MAGO (acute kidney injury) 10/26/2018    Anemia of chronic disease 11/7/2018    Arthritis     Cataract     Diabetes mellitus     Foul smelling urine 12/5/2017    GERD (gastroesophageal reflux disease)     Heart attack     Hypertension     Hypertensive emergency 10/24/2018    Hypertensive urgency 8/9/2018    Joint pain     NSTEMI (non-ST elevated myocardial infarction) 12/5/2015    S/P coronary artery stent placement     2 stents 12/2015    Seizures     Stroke 2015    about 3-4 with light right leg weakness       Past Surgical History:   Procedure Laterality Date    ANGIOGRAM, CORONARY ARTERY N/A 11/13/2018    Performed by [facility] at [facility] CATH LAB/EP    CATARACT EXTRACTION Bilateral 09/12/2017    COLONOSCOPY  04/05/2007    COLONOSCOPY N/A 9/18/2018    Performed by Huy New MD at [facility] ENDO    CORONARY ANGIOPLASTY WITH STENT PLACEMENT      EXTRACTION-CATARACT-IOL Left 9/12/2017    Performed by Jonny Mckinney MD at [facility] OR    EXTRACTION-CATARACT-IOL Right 2/14/2017    Performed by [facility]ny Mckinney MD at [facility] OR    HEART CATH-LEFT N/A 12/7/2015    Performed by Franki Rodrigez MD at [facility] CATH LAB    IVUS, Coronary  11/13/2018    Performed by [facility] at [facility] CATH LAB/EP    Left heart cath Left 11/13/2018    Performed by [facility] at [facility] CATH LAB/EP    Left heart cath Left 11/12/2018    Performed by Sukumar Diaz MD at [facility] CATH LAB    REPAIR- RETINA ANTERIOR Right 2/14/2017    Performed by Jonny Mckinney MD at [facility] OR    TUBAL LIGATION         Review of patient's allergies indicates:  No Known Allergies    Current Facility-Administered Medications on File Prior to Encounter   Medication    [COMPLETED] amLODIPine tablet 5 mg    [COMPLETED] aspirin tablet 325 mg    [COMPLETED] carvedilol tablet 25 mg    [COMPLETED] heparin 25,000 units in dextrose 5% (100 units/ml) IV bolus from bag  INITIAL BOLUS (max bolus 4000 units)    [COMPLETED] nitroGLYCERIN SL tablet 0.4 mg    [COMPLETED] nitroGLYCERIN SL tablet 0.4 mg    [COMPLETED] nitroGLYCERIN SL tablet 0.4 mg    [DISCONTINUED] heparin 25,000 units in dextrose 5% (100 units/ml) IV bolus from bag - ADDITIONAL PRN BOLUS - 30 units/kg (max bolus 4000 units)    [DISCONTINUED] heparin 25,000 units in dextrose 5% (100 units/ml) IV bolus from bag - ADDITIONAL PRN BOLUS - 60 units/kg (max bolus 4000 units)    [DISCONTINUED] heparin 25,000 units in dextrose 5% 250 mL (100 units/mL) infusion LOW INTENSITY nomogram - [facility]    [DISCONTINUED] nitroGLYCERIN 50 mg in dextrose 5 % 250 mL infusion (TITRATING)     Current Outpatient Medications on File Prior to Encounter   Medication Sig    acetaminophen (TYLENOL) 325 MG tablet Take 2 tablets (650 mg total) by mouth every 6 (six) hours as needed for Pain.    ammonium lactate 12 % Crea Apply to the body at least twice daily    aspirin (ECOTRIN) 81 MG EC tablet Take 1 tablet (81 mg total) by mouth once daily.    atorvastatin (LIPITOR) 80 MG tablet TAKE ONE TABLET BY MOUTH ONCE DAILY    betamethasone dipropionate (DIPROLENE) 0.05 % cream Apply topically 2 (two) times daily.    blood sugar diagnostic Strp 1 strip by Misc.(Non-Drug; Combo Route) route 3 (three) times daily.    blood-glucose meter (RELION ALL-IN-ONE METER) kit Use as instructed    brimonidine 0.2% (ALPHAGAN) 0.2 % Drop Place 1 drop into the right eye 3 (three) times daily.    carvedilol (COREG) 25 MG tablet Take 1 tablet (25 mg total) by mouth 2 (two) times daily with meals.    cetirizine (ZYRTEC) 10 MG tablet Take 1 tablet (10 mg total) by mouth every morning.    clopidogrel (PLAVIX) 75 mg tablet Take 1 tablet (75 mg total) by mouth once daily.    dorzolamide-timolol 2-0.5% (COSOPT) 22.3-6.8 mg/mL ophthalmic solution Place 1 drop into the right eye 2 (two) times daily.    fish oil-omega-3 fatty acids 300-1,000 mg capsule Take 2 capsules  "(2 g total) by mouth once daily.    FLUoxetine (PROZAC) 20 MG capsule Take 1 capsule (20 mg total) by mouth once daily.    furosemide (LASIX) 40 MG tablet Take 1 tablet (40 mg total) by mouth 2 (two) times daily.    gabapentin (NEURONTIN) 300 MG capsule Take 1 capsule (300 mg total) by mouth 3 (three) times daily.    hydrOXYzine HCl (ATARAX) 25 MG tablet     insulin aspart U-100 (NOVOLOG) 100 unit/mL InPn pen Inject 14 Units into the skin 3 (three) times daily with meals.    lancets (ONETOUCH ULTRASOFT LANCETS) Misc 1 application by Misc.(Non-Drug; Combo Route) route 3 (three) times daily.    latanoprost (XALATAN) 0.005 % ophthalmic solution Place 1 drop into the right eye once daily.    LEVEMIR FLEXTOUCH U-100 INSULN 100 unit/mL (3 mL) InPn pen Inject 35 Units into the skin every evening.    levETIRAcetam (KEPPRA) 500 MG Tab Take 2 tablets (1,000 mg total) by mouth 2 (two) times daily. 2 tablets twice per day    losartan (COZAAR) 50 MG tablet TAKE ONE TABLET BY MOUTH IN THE EVENING    metFORMIN (GLUCOPHAGE) 500 MG tablet Take 1 tablet (500 mg total) by mouth 2 (two) times daily with meals. 1/2 tablet BID if you develop loose stools that don't improve in 1week    nitroGLYCERIN (NITROSTAT) 0.4 MG SL tablet Place 1 tablet (0.4 mg total) under the tongue every 5 (five) minutes as needed for Chest pain.    nystatin (MYCOSTATIN) powder Apply topically 2 (two) times daily.    pen needle, diabetic 32 gauge x 5/32" Ndle 1 each by Misc.(Non-Drug; Combo Route) route 2 (two) times daily with meals.    polyvinyl alcohol, artificial tears, (LIQUIFILM TEARS) 1.4 % ophthalmic solution 1 drop as needed.    triamcinolone acetonide 0.1% (KENALOG) 0.1 % cream Apply topically 2 (two) times daily. Thin amount to the skin on the arms and legs     Family History     Problem Relation (Age of Onset)    Cancer Father    Diabetes Daughter, Sister    Hypertension Daughter    No Known Problems Mother    Stomach cancer Father    "     Tobacco Use    Smoking status: Never Smoker    Smokeless tobacco: Never Used   Substance and Sexual Activity    Alcohol use: No    Drug use: No    Sexual activity: No     Birth control/protection: Surgical     Review of Systems   Constitution: Negative for chills, decreased appetite, diaphoresis, fever and weakness.   Cardiovascular: Positive for chest pain. Negative for claudication, cyanosis, dyspnea on exertion, irregular heartbeat, leg swelling, near-syncope, orthopnea, palpitations, paroxysmal nocturnal dyspnea and syncope.   Respiratory: Negative for cough, hemoptysis, shortness of breath and wheezing.    Gastrointestinal: Negative for bloating, abdominal pain, constipation, diarrhea, melena, nausea and vomiting.   Neurological: Negative for dizziness.     Objective:     Vital Signs (Most Recent):  Temp: 98.7 °F (37.1 °C) (01/11/19 1536)  Pulse: 87 (01/11/19 1624)  Resp: (!) 32 (01/11/19 1624)  SpO2: 97 % (01/11/19 1624) Vital Signs (24h Range):  Temp:  [98.5 °F (36.9 °C)-98.7 °F (37.1 °C)] 98.7 °F (37.1 °C)  Pulse:  [78-95] 87  Resp:  [10-55] 32  SpO2:  [91 %-100 %] 97 %  BP: (116-212)/() 167/93     Weight: 82.3 kg (181 lb 7 oz)  Body mass index is 27.59 kg/m².    SpO2: 97 %  O2 Device (Oxygen Therapy): nasal cannula w/ humidification    No intake or output data in the 24 hours ending 01/11/19 1626    Lines/Drains/Airways     Peripheral Intravenous Line                 Peripheral IV - Single Lumen 01/11/19 0801 Left Antecubital less than 1 day         Peripheral IV - Single Lumen 01/11/19 0950 Left Wrist less than 1 day         Peripheral IV - Single Lumen 01/11/19 0956 Right Hand less than 1 day                Physical Exam   Constitutional: She is oriented to person, place, and time. She appears well-developed and well-nourished. No distress.   Cardiovascular: Normal rate and regular rhythm. Exam reveals no gallop.   No murmur heard.  Pulmonary/Chest: Effort normal and breath sounds normal.  No respiratory distress. She has no wheezes.   Abdominal: Soft. Bowel sounds are normal. She exhibits no distension. There is no tenderness.   Neurological: She is alert and oriented to person, place, and time.   Skin: Skin is warm and dry.       Significant Labs:     Recent Labs   Lab 01/11/19  0802   *   K 4.5   CL 97   CO2 28   BUN 19   CREATININE 1.6*     Recent Labs   Lab 01/11/19  0802   WBC 4.88   RBC 3.80*   HGB 11.0*   HCT 34.7*      MCV 91   MCH 28.9   MCHC 31.7*     Recent Labs   Lab 01/11/19  1046   TROPONINI 0.226*       Significant Imaging:     TTE 1/11/2019 at [facility] with pending results

## 2019-01-12 PROBLEM — N30.00 ACUTE CYSTITIS: Status: RESOLVED | Noted: 2018-08-11 | Resolved: 2019-01-12

## 2019-01-12 PROBLEM — Z12.11 SCREENING FOR COLON CANCER: Status: RESOLVED | Noted: 2018-09-18 | Resolved: 2019-01-12

## 2019-01-12 PROBLEM — Z01.818 PRE-OP EVALUATION: Status: RESOLVED | Noted: 2017-01-09 | Resolved: 2019-01-12

## 2019-01-12 PROBLEM — Z00.00 HEALTHCARE MAINTENANCE: Status: RESOLVED | Noted: 2018-11-20 | Resolved: 2019-01-12

## 2019-01-12 PROBLEM — N17.9 AKI (ACUTE KIDNEY INJURY): Status: RESOLVED | Noted: 2018-10-26 | Resolved: 2019-01-12

## 2019-01-12 PROBLEM — N18.30 CKD (CHRONIC KIDNEY DISEASE) STAGE 3, GFR 30-59 ML/MIN: Chronic | Status: ACTIVE | Noted: 2018-12-06

## 2019-01-12 PROBLEM — R10.9 ABDOMINAL PAIN: Status: ACTIVE | Noted: 2019-01-12

## 2019-01-12 LAB
ANION GAP SERPL CALC-SCNC: 12 MMOL/L
AORTIC ROOT ANNULUS: 2.92 CM
AORTIC VALVE CUSP SEPERATION: 1.91 CM
APTT BLDCRRT: 30.2 SEC
AV INDEX (PROSTH): 0.88
AV MEAN GRADIENT: 7.06 MMHG
AV PEAK GRADIENT: 12.67 MMHG
AV VALVE AREA: 2.28 CM2
BASOPHILS # BLD AUTO: 0.01 K/UL
BASOPHILS NFR BLD: 0.2 %
BSA FOR ECHO PROCEDURE: 1.96 M2
BUN SERPL-MCNC: 18 MG/DL
CALCIUM SERPL-MCNC: 8.6 MG/DL
CHLORIDE SERPL-SCNC: 97 MMOL/L
CHOLEST SERPL-MCNC: 191 MG/DL
CHOLEST/HDLC SERPL: 3.2 {RATIO}
CK SERPL-CCNC: 135 U/L
CO2 SERPL-SCNC: 27 MMOL/L
CREAT SERPL-MCNC: 1.4 MG/DL
CV ECHO LV RWT: 0.38 CM
DIFFERENTIAL METHOD: ABNORMAL
DOP CALC AO PEAK VEL: 1.78 M/S
DOP CALC AO VTI: 24.27 CM
DOP CALC LVOT AREA: 2.6 CM2
DOP CALC LVOT DIAMETER: 1.82 CM
DOP CALC LVOT STROKE VOLUME: 55.23 CM3
DOP CALCLVOT PEAK VEL VTI: 21.24 CM
E WAVE DECELERATION TIME: 267.52 MSEC
E/A RATIO: 1.21
ECHO LV POSTERIOR WALL: 0.94 CM (ref 0.6–1.1)
EOSINOPHIL # BLD AUTO: 0 K/UL
EOSINOPHIL NFR BLD: 0.2 %
ERYTHROCYTE [DISTWIDTH] IN BLOOD BY AUTOMATED COUNT: 13.4 %
EST. GFR  (AFRICAN AMERICAN): 47 ML/MIN/1.73 M^2
EST. GFR  (NON AFRICAN AMERICAN): 41 ML/MIN/1.73 M^2
ESTIMATED AVG GLUCOSE: 255 MG/DL
FRACTIONAL SHORTENING: 30 % (ref 28–44)
GLUCOSE SERPL-MCNC: 360 MG/DL
HBA1C MFR BLD HPLC: 10.5 %
HCT VFR BLD AUTO: 33.3 %
HDLC SERPL-MCNC: 59 MG/DL
HDLC SERPL: 30.9 %
HGB BLD-MCNC: 10.4 G/DL
INTERVENTRICULAR SEPTUM: 0.87 CM (ref 0.6–1.1)
IVRT: 0.06 MSEC
LA MAJOR: 5.29 CM
LA WIDTH: 4.41 CM
LDLC SERPL CALC-MCNC: 112.8 MG/DL
LEFT ATRIUM SIZE: 4.55 CM
LEFT INTERNAL DIMENSION IN SYSTOLE: 3.41 CM (ref 2.1–4)
LEFT VENTRICLE DIASTOLIC VOLUME INDEX: 57.36 ML/M2
LEFT VENTRICLE DIASTOLIC VOLUME: 112.48 ML
LEFT VENTRICLE MASS INDEX: 78.3 G/M2
LEFT VENTRICLE SYSTOLIC VOLUME INDEX: 24.4 ML/M2
LEFT VENTRICLE SYSTOLIC VOLUME: 47.8 ML
LEFT VENTRICULAR INTERNAL DIMENSION IN DIASTOLE: 4.89 CM (ref 3.5–6)
LEFT VENTRICULAR MASS: 153.55 G
LYMPHOCYTES # BLD AUTO: 0.9 K/UL
LYMPHOCYTES NFR BLD: 20.7 %
MCH RBC QN AUTO: 27.8 PG
MCHC RBC AUTO-ENTMCNC: 31.2 G/DL
MCV RBC AUTO: 89 FL
MONOCYTES # BLD AUTO: 0.2 K/UL
MONOCYTES NFR BLD: 3.9 %
MV PEAK A VEL: 1.17 M/S
MV PEAK E VEL: 1.41 M/S
NEUTROPHILS # BLD AUTO: 3.1 K/UL
NEUTROPHILS NFR BLD: 74.8 %
NONHDLC SERPL-MCNC: 132 MG/DL
PLATELET # BLD AUTO: 269 K/UL
PMV BLD AUTO: 11 FL
POCT GLUCOSE: 198 MG/DL (ref 70–110)
POCT GLUCOSE: 253 MG/DL (ref 70–110)
POCT GLUCOSE: 268 MG/DL (ref 70–110)
POCT GLUCOSE: 323 MG/DL (ref 70–110)
POTASSIUM SERPL-SCNC: 3.7 MMOL/L
PULM VEIN S/D RATIO: 1.02
PV PEAK D VEL: 0.58 M/S
PV PEAK S VEL: 0.59 M/S
PV PEAK VELOCITY: 0.88 CM/S
RA MAJOR: 5.29 CM
RA PRESSURE: 8 MMHG
RBC # BLD AUTO: 3.74 M/UL
RIGHT VENTRICULAR END-DIASTOLIC DIMENSION: 2.84 CM
SODIUM SERPL-SCNC: 136 MMOL/L
TRIGL SERPL-MCNC: 96 MG/DL
TROPONIN I SERPL DL<=0.01 NG/ML-MCNC: 0.21 NG/ML
WBC # BLD AUTO: 4.15 K/UL

## 2019-01-12 PROCEDURE — 99233 SBSQ HOSP IP/OBS HIGH 50: CPT | Mod: 25,,, | Performed by: STUDENT IN AN ORGANIZED HEALTH CARE EDUCATION/TRAINING PROGRAM

## 2019-01-12 PROCEDURE — 93010 EKG 12-LEAD: ICD-10-PCS | Mod: ,,, | Performed by: INTERNAL MEDICINE

## 2019-01-12 PROCEDURE — 99233 PR SUBSEQUENT HOSPITAL CARE,LEVL III: ICD-10-PCS | Mod: 25,,, | Performed by: STUDENT IN AN ORGANIZED HEALTH CARE EDUCATION/TRAINING PROGRAM

## 2019-01-12 PROCEDURE — 82550 ASSAY OF CK (CPK): CPT

## 2019-01-12 PROCEDURE — 63600175 PHARM REV CODE 636 W HCPCS: Performed by: STUDENT IN AN ORGANIZED HEALTH CARE EDUCATION/TRAINING PROGRAM

## 2019-01-12 PROCEDURE — 25000003 PHARM REV CODE 250: Performed by: STUDENT IN AN ORGANIZED HEALTH CARE EDUCATION/TRAINING PROGRAM

## 2019-01-12 PROCEDURE — 12000002 HC ACUTE/MED SURGE SEMI-PRIVATE ROOM

## 2019-01-12 PROCEDURE — 80048 BASIC METABOLIC PNL TOTAL CA: CPT

## 2019-01-12 PROCEDURE — 25000003 PHARM REV CODE 250: Performed by: NURSE PRACTITIONER

## 2019-01-12 PROCEDURE — 83036 HEMOGLOBIN GLYCOSYLATED A1C: CPT

## 2019-01-12 PROCEDURE — 85025 COMPLETE CBC W/AUTO DIFF WBC: CPT

## 2019-01-12 PROCEDURE — 84484 ASSAY OF TROPONIN QUANT: CPT

## 2019-01-12 PROCEDURE — 93005 ELECTROCARDIOGRAM TRACING: CPT

## 2019-01-12 PROCEDURE — 36415 COLL VENOUS BLD VENIPUNCTURE: CPT

## 2019-01-12 PROCEDURE — 93010 ELECTROCARDIOGRAM REPORT: CPT | Mod: ,,, | Performed by: INTERNAL MEDICINE

## 2019-01-12 PROCEDURE — 94761 N-INVAS EAR/PLS OXIMETRY MLT: CPT

## 2019-01-12 PROCEDURE — 80061 LIPID PANEL: CPT

## 2019-01-12 PROCEDURE — 27000221 HC OXYGEN, UP TO 24 HOURS

## 2019-01-12 PROCEDURE — 85730 THROMBOPLASTIN TIME PARTIAL: CPT

## 2019-01-12 RX ORDER — HYDRALAZINE HYDROCHLORIDE 25 MG/1
25 TABLET, FILM COATED ORAL EVERY 8 HOURS
Status: DISCONTINUED | OUTPATIENT
Start: 2019-01-12 | End: 2019-01-12

## 2019-01-12 RX ORDER — CARVEDILOL 6.25 MG/1
6.25 TABLET ORAL 2 TIMES DAILY
Status: DISCONTINUED | OUTPATIENT
Start: 2019-01-12 | End: 2019-01-14 | Stop reason: HOSPADM

## 2019-01-12 RX ORDER — AMLODIPINE BESYLATE 5 MG/1
5 TABLET ORAL DAILY
Status: DISCONTINUED | OUTPATIENT
Start: 2019-01-12 | End: 2019-01-14 | Stop reason: HOSPADM

## 2019-01-12 RX ORDER — AMOXICILLIN 250 MG
1 CAPSULE ORAL 2 TIMES DAILY PRN
Status: DISCONTINUED | OUTPATIENT
Start: 2019-01-12 | End: 2019-01-14 | Stop reason: HOSPADM

## 2019-01-12 RX ORDER — HEPARIN SODIUM 5000 [USP'U]/ML
5000 INJECTION, SOLUTION INTRAVENOUS; SUBCUTANEOUS EVERY 8 HOURS
Status: DISCONTINUED | OUTPATIENT
Start: 2019-01-12 | End: 2019-01-14 | Stop reason: HOSPADM

## 2019-01-12 RX ORDER — PANTOPRAZOLE SODIUM 40 MG/1
40 TABLET, DELAYED RELEASE ORAL DAILY
Status: DISCONTINUED | OUTPATIENT
Start: 2019-01-12 | End: 2019-01-14 | Stop reason: HOSPADM

## 2019-01-12 RX ORDER — FUROSEMIDE 10 MG/ML
40 INJECTION INTRAMUSCULAR; INTRAVENOUS ONCE
Status: COMPLETED | OUTPATIENT
Start: 2019-01-12 | End: 2019-01-12

## 2019-01-12 RX ADMIN — CARVEDILOL 25 MG: 25 TABLET, FILM COATED ORAL at 08:01

## 2019-01-12 RX ADMIN — HEPARIN SODIUM 5000 UNITS: 5000 INJECTION, SOLUTION INTRAVENOUS; SUBCUTANEOUS at 01:01

## 2019-01-12 RX ADMIN — INSULIN DETEMIR 20 UNITS: 100 INJECTION, SOLUTION SUBCUTANEOUS at 09:01

## 2019-01-12 RX ADMIN — ASPIRIN 81 MG: 81 TABLET, COATED ORAL at 08:01

## 2019-01-12 RX ADMIN — INSULIN ASPART 8 UNITS: 100 INJECTION, SOLUTION INTRAVENOUS; SUBCUTANEOUS at 08:01

## 2019-01-12 RX ADMIN — LIDOCAINE HYDROCHLORIDE: 20 SOLUTION ORAL; TOPICAL at 08:01

## 2019-01-12 RX ADMIN — ONDANSETRON 8 MG: 8 TABLET, ORALLY DISINTEGRATING ORAL at 08:01

## 2019-01-12 RX ADMIN — PANTOPRAZOLE SODIUM 40 MG: 40 TABLET, DELAYED RELEASE ORAL at 08:01

## 2019-01-12 RX ADMIN — FUROSEMIDE 40 MG: 10 INJECTION, SOLUTION INTRAMUSCULAR; INTRAVENOUS at 12:01

## 2019-01-12 RX ADMIN — FLUOXETINE HYDROCHLORIDE 20 MG: 20 CAPSULE ORAL at 08:01

## 2019-01-12 RX ADMIN — ISOSORBIDE MONONITRATE 30 MG: 30 TABLET, EXTENDED RELEASE ORAL at 08:01

## 2019-01-12 RX ADMIN — CLOPIDOGREL BISULFATE 75 MG: 75 TABLET ORAL at 08:01

## 2019-01-12 RX ADMIN — LEVETIRACETAM 1000 MG: 500 TABLET ORAL at 08:01

## 2019-01-12 RX ADMIN — INSULIN ASPART 1 UNITS: 100 INJECTION, SOLUTION INTRAVENOUS; SUBCUTANEOUS at 09:01

## 2019-01-12 RX ADMIN — LEVETIRACETAM 1000 MG: 500 TABLET ORAL at 09:01

## 2019-01-12 RX ADMIN — HYDROCODONE BITARTRATE AND ACETAMINOPHEN 1 TABLET: 5; 325 TABLET ORAL at 12:01

## 2019-01-12 RX ADMIN — GABAPENTIN 300 MG: 300 CAPSULE ORAL at 08:01

## 2019-01-12 RX ADMIN — HEPARIN SODIUM 5000 UNITS: 5000 INJECTION, SOLUTION INTRAVENOUS; SUBCUTANEOUS at 09:01

## 2019-01-12 RX ADMIN — GABAPENTIN 300 MG: 300 CAPSULE ORAL at 04:01

## 2019-01-12 RX ADMIN — ATORVASTATIN CALCIUM 80 MG: 40 TABLET, FILM COATED ORAL at 08:01

## 2019-01-12 RX ADMIN — GABAPENTIN 300 MG: 300 CAPSULE ORAL at 09:01

## 2019-01-12 RX ADMIN — INSULIN ASPART 6 UNITS: 100 INJECTION, SOLUTION INTRAVENOUS; SUBCUTANEOUS at 01:01

## 2019-01-12 RX ADMIN — HYDRALAZINE HYDROCHLORIDE 10 MG: 20 INJECTION INTRAMUSCULAR; INTRAVENOUS at 12:01

## 2019-01-12 RX ADMIN — CARVEDILOL 6.25 MG: 6.25 TABLET, FILM COATED ORAL at 09:01

## 2019-01-12 RX ADMIN — AMLODIPINE BESYLATE 5 MG: 5 TABLET ORAL at 08:01

## 2019-01-12 RX ADMIN — INSULIN ASPART 6 UNITS: 100 INJECTION, SOLUTION INTRAVENOUS; SUBCUTANEOUS at 04:01

## 2019-01-12 NOTE — SUBJECTIVE & OBJECTIVE
Past Medical History:   Diagnosis Date    MAGO (acute kidney injury) 10/26/2018    Anemia of chronic disease 11/7/2018    Arthritis     Cataract     Diabetes mellitus, type 2     Foul smelling urine 12/5/2017    GERD (gastroesophageal reflux disease)     Heart attack     Hypertension     Hypertensive emergency 10/24/2018    Hypertensive urgency 8/9/2018    Joint pain     NSTEMI (non-ST elevated myocardial infarction) 12/5/2015    S/P coronary artery stent placement     2 stents 12/2015    Seizures     Stroke 2015    about 3-4 with light right leg weakness       Past Surgical History:   Procedure Laterality Date    ANGIOGRAM, CORONARY ARTERY N/A 11/13/2018    [facility] CATH LAB/EP    CATARACT EXTRACTION Bilateral 09/12/2017    COLONOSCOPY  04/05/2007    COLONOSCOPY N/A 9/18/2018    Performed by Huy New MD at [facility] ENDO    CORONARY ANGIOPLASTY WITH STENT PLACEMENT      EXTRACTION-CATARACT-IOL Left 9/12/2017    Performed by [facility] at [facility] OR    EXTRACTION-CATARACT-IOL Right 2/14/2017    Performed by [facility] at [facility] OR    HEART CATH-LEFT N/A 12/7/2015    Performed by Franki Rodrigez MD at [facility] CATH LAB    IVUS, Coronary  11/13/2018    [facility] by [facility] at [facility] CATH LAB/EP    Left heart cath Left 11/13/2018    [facility] by [facility] at [facility] CATH LAB/EP    Left heart cath Left 11/12/2018    Performed by Sukumar Diaz MD at [facility] CATH LAB    REPAIR- RETINA ANTERIOR Right 2/14/2017    Performed by [facility] at [facility] OR    TUBAL LIGATION         Review of patient's allergies indicates:  No Known Allergies    Current Facility-Administered Medications on File Prior to Encounter   Medication    [DISCONTINUED] heparin 25,000 units in dextrose 5% (100 units/ml) IV bolus from bag - ADDITIONAL PRN BOLUS - 30 units/kg (max bolus 4000 units)    [DISCONTINUED] heparin 25,000 units in dextrose  5% (100 units/ml) IV bolus from bag - ADDITIONAL PRN BOLUS - 60 units/kg (max bolus 4000 units)    [DISCONTINUED] heparin 25,000 units in dextrose 5% 250 mL (100 units/mL) infusion LOW INTENSITY nomogram - [facility]    [DISCONTINUED] nitroGLYCERIN 50 mg in dextrose 5 % 250 mL infusion (TITRATING)     Current Outpatient Medications on File Prior to Encounter   Medication Sig    acetaminophen (TYLENOL) 325 MG tablet Take 2 tablets (650 mg total) by mouth every 6 (six) hours as needed for Pain.    ammonium lactate 12 % Crea Apply to the body at least twice daily    aspirin (ECOTRIN) 81 MG EC tablet Take 1 tablet (81 mg total) by mouth once daily.    atorvastatin (LIPITOR) 80 MG tablet TAKE ONE TABLET BY MOUTH ONCE DAILY    betamethasone dipropionate (DIPROLENE) 0.05 % cream Apply topically 2 (two) times daily.    blood sugar diagnostic Strp 1 strip by Misc.(Non-Drug; Combo Route) route 3 (three) times daily.    blood-glucose meter (RELION ALL-IN-ONE METER) kit Use as instructed    brimonidine 0.2% (ALPHAGAN) 0.2 % Drop Place 1 drop into the right eye 3 (three) times daily.    carvedilol (COREG) 25 MG tablet Take 1 tablet (25 mg total) by mouth 2 (two) times daily with meals.    cetirizine (ZYRTEC) 10 MG tablet Take 1 tablet (10 mg total) by mouth every morning.    clopidogrel (PLAVIX) 75 mg tablet Take 1 tablet (75 mg total) by mouth once daily.    dorzolamide-timolol 2-0.5% (COSOPT) 22.3-6.8 mg/mL ophthalmic solution Place 1 drop into the right eye 2 (two) times daily.    fish oil-omega-3 fatty acids 300-1,000 mg capsule Take 2 capsules (2 g total) by mouth once daily.    FLUoxetine (PROZAC) 20 MG capsule Take 1 capsule (20 mg total) by mouth once daily.    furosemide (LASIX) 40 MG tablet Take 1 tablet (40 mg total) by mouth 2 (two) times daily.    gabapentin (NEURONTIN) 300 MG capsule Take 1 capsule (300 mg total) by mouth 3 (three) times daily.    hydrOXYzine HCl (ATARAX) 25 MG tablet     insulin  "aspart U-100 (NOVOLOG) 100 unit/mL InPn pen Inject 14 Units into the skin 3 (three) times daily with meals.    lancets (ONETOUCH ULTRASOFT LANCETS) Misc 1 application by Misc.(Non-Drug; Combo Route) route 3 (three) times daily.    latanoprost (XALATAN) 0.005 % ophthalmic solution Place 1 drop into the right eye once daily.    LEVEMIR FLEXTOUCH U-100 INSULN 100 unit/mL (3 mL) InPn pen Inject 35 Units into the skin every evening.    levETIRAcetam (KEPPRA) 500 MG Tab Take 2 tablets (1,000 mg total) by mouth 2 (two) times daily. 2 tablets twice per day    losartan (COZAAR) 50 MG tablet TAKE ONE TABLET BY MOUTH IN THE EVENING    metFORMIN (GLUCOPHAGE) 500 MG tablet Take 1 tablet (500 mg total) by mouth 2 (two) times daily with meals. 1/2 tablet BID if you develop loose stools that don't improve in 1week    nitroGLYCERIN (NITROSTAT) 0.4 MG SL tablet Place 1 tablet (0.4 mg total) under the tongue every 5 (five) minutes as needed for Chest pain.    nystatin (MYCOSTATIN) powder Apply topically 2 (two) times daily.    pen needle, diabetic 32 gauge x 5/32" Ndle 1 each by Misc.(Non-Drug; Combo Route) route 2 (two) times daily with meals.    polyvinyl alcohol, artificial tears, (LIQUIFILM TEARS) 1.4 % ophthalmic solution 1 drop as needed.    triamcinolone acetonide 0.1% (KENALOG) 0.1 % cream Apply topically 2 (two) times daily. Thin amount to the skin on the arms and legs     Family History     Problem Relation (Age of Onset)    Cancer Father    Diabetes Daughter, Sister    Hypertension Daughter    No Known Problems Mother    Stomach cancer Father        Tobacco Use    Smoking status: Never Smoker    Smokeless tobacco: Never Used   Substance and Sexual Activity    Alcohol use: No    Drug use: No    Sexual activity: No     Birth control/protection: Surgical     Review of Systems   Constitutional: Positive for appetite change and fatigue. Negative for chills.   HENT: Negative for congestion, postnasal drip, " "rhinorrhea and sore throat.    Eyes: Negative for visual disturbance.   Respiratory: Negative for chest tightness and shortness of breath.    Cardiovascular: Negative for chest pain.   Gastrointestinal: Positive for abdominal pain (LLQ "Burning"), nausea and vomiting.   Genitourinary: Negative for difficulty urinating.   Musculoskeletal: Negative for arthralgias, back pain, gait problem, myalgias and neck pain.   Skin: Negative for color change.   Neurological: Negative for dizziness, syncope, light-headedness and headaches.   Hematological: Does not bruise/bleed easily.   Psychiatric/Behavioral: Negative for agitation and confusion.     Objective:     Vital Signs (Most Recent):  Temp: 96.6 °F (35.9 °C) (01/12/19 1139)  Pulse: 67 (01/12/19 1200)  Resp: 18 (01/12/19 1139)  BP: 100/60 (01/12/19 1139)  SpO2: 96 % (01/12/19 1155) Vital Signs (24h Range):  Temp:  [96.6 °F (35.9 °C)-99 °F (37.2 °C)] 96.6 °F (35.9 °C)  Pulse:  [67-89] 67  Resp:  [7-32] 18  SpO2:  [91 %-99 %] 96 %  BP: ()/() 100/60     Weight: 82.3 kg (181 lb 7 oz)  Body mass index is 27.59 kg/m².    Physical Exam   Constitutional: She is oriented to person, place, and time. She appears well-developed and well-nourished.   HENT:   Head: Normocephalic.   Eyes: Pupils are equal, round, and reactive to light.   Neck: Normal range of motion.   Cardiovascular: Normal rate and regular rhythm.   Pulmonary/Chest: Effort normal and breath sounds normal.   Abdominal: Soft. She exhibits no distension. There is tenderness (LLQ).   Musculoskeletal: Normal range of motion.   Neurological: She is oriented to person, place, and time.   Lethargic, easy to arouse   Skin: Skin is warm and dry.   Psychiatric: She has a normal mood and affect.       Significant Labs:   CBC:   Recent Labs   Lab 01/11/19  0802 01/12/19  0319   WBC 4.88 4.15   HGB 11.0* 10.4*   HCT 34.7* 33.3*    269     CMP:   Recent Labs   Lab 01/11/19  0802 01/11/19  1602 01/12/19  0319   NA " 134* 137 136   K 4.5 3.6 3.7   CL 97 98 97   CO2 28 30* 27   * 381* 360*   BUN 19 18 18   CREATININE 1.6* 1.4 1.4   CALCIUM 9.1 8.7 8.6*   PROT 7.0  --   --    ALBUMIN 2.4*  --   --    BILITOT 0.6  --   --    ALKPHOS 109  --   --    AST 35  --   --    ALT 38  --   --    ANIONGAP 9 9 12   EGFRNONAA 35.0* 41* 41*       Significant Imaging: I have reviewed and interpreted all pertinent imaging results/findings within the past 24 hours.

## 2019-01-12 NOTE — PLAN OF CARE
Problem: Adult Inpatient Plan of Care  Goal: Plan of Care Review  Outcome: Ongoing (interventions implemented as appropriate)  Pt resting in bed without complaints. BP under control with nitro gtts currently on hold. Heparin infusing at 12mcg/kg/hr and therapeutic. Next ptt at 2230. Will continue to monitor.

## 2019-01-12 NOTE — PLAN OF CARE
Spoke to Dr. Bailey regarding orders to DC nitro and give ambien. It was expressed concerns that this may be cardiac/NSTEMI related due to complaints of N/V, chest pain and abdominal pain. Dr. Bailey stated he felt like it wasn't life threatening and that he may consult primary care or GI in the morning. It was also suggested if we can give morphine for pain and not give ambien. He stated he does not want to give morphine because he wanted to give something for long acting like the narco. I asked him why he was stopping the gtt if pts in active chest pain, he stated drip was for hypertensive urgency which has resolved and that Imdur will replace tridil, and he also did not order EKG. He was informed that pt was having PVCs, and had inverted T-waves.     Plan of care was discussed with primary RN Lisha and pt.

## 2019-01-12 NOTE — NURSING
Notified dr Bailey of pt c/o severe abd pain x2 days. Pt states she had watery BM yesterday. Pt vomitted 300cc brown emesis at 1940 today. Pain was unrelieved after vomiting. Pt did not eat anything upon arrival to unit because of the pain. Was given prn meds for gas, and zofran for nausea. Dr. Bailey states he will put an order in for GI cocktail and if pain is unrelieved he will put in a consult for GI to see pt.     20:30 Called answering service to contact Dr Bailey, awaiting call back.      21:02 Dr Bailey called back and informed him pt is still gaging but unable to vomit. States to still give GI cocktail.  21:16 dr bailey walked in pt room and instructed pt to try and get some rest.     22:53 pt c/o chest pain, rates 7/10. /93, see MAR    23:35 Dr Bailey called and informed of pt now c/o of chest pain and tightening. Restarted Nitro infusion, pt still nauseated. Telemetry with frequent PVC's and appears to have intermittent depressed T waves. Asked if he wanted a 12 lead EKG and morphine for pain management. States he does not. Instructed to d/c nitro infusion and start imdur 30, narco 5mg , hydralazine 10mg, ambien 5mg prn, and he will assess pt in the am. Notified bernardino Vyas charge of orders given per dr bailey.

## 2019-01-12 NOTE — PROGRESS NOTES
[facility]  Cardiology  Progress Note    Patient Name: Lilly Frye  MRN: [id number]  Admission Date: 1/11/2019  Hospital Length of Stay: 1 days  Code Status: Prior   Attending Physician: Rey Bailey MD   Primary Care Physician: Bud Carmen MD  Expected Discharge Date:   Principal Problem:Chest pain    Subjective:   Ms. Frye is a 54 yo F with pmhx of HFpEF, CAD (NSTEMI s/p stents placed x2 12/15, one MATTEO in 11/2018), HTN, HLD, DM2, Stroke, seizures who presents after referral from [facility] ER. Pt was noted to have chest pain and vague abdominal discomfort. (abd discomfort is acute on chronic). Pt was noted to hav SBP ~ 200 and trop elevation of 0.2.  Pt was transferred to [place] due to NSTEMI due to either hypertensive urgency vs in-stent thrombosis.  Pt underwent LAD PCI in Nov 2018.  - Pt was put on heparin gtt and nitroglycerin gtt. SBP was better controlled in the 160's. ecg non-ischemic.   - hx of medication non-compliance with plavix in Nov.  When eval pt, she notes still in discomfort. 3-4 / 10. Eating dinner.   Shortly there afterward, pt was noting dry heaves and abdominal discomfort. Bp was better improved. Chest pain also has improved.        1/12/19:  Chest pain is resolved. Continues to c/o left sided abdominal pain. Unable to tolerate most of her dinner. Norco help a little with her pain. No BM since hospital admit.  Trop and ecg stable.    Review of Systems   Constitutional: Positive for malaise/fatigue. Negative for chills and weight loss.   Respiratory: Negative for cough and shortness of breath.    Cardiovascular: Negative for chest pain.   Gastrointestinal: Positive for abdominal pain, constipation, nausea and vomiting.   Musculoskeletal: Positive for myalgias.   Psychiatric/Behavioral: The patient has insomnia.      Objective:     Vital Signs (Most Recent):  Temp: 99 °F (37.2 °C) (01/12/19 0745)  Pulse: 87 (01/12/19 0721)  Resp: 20 (01/12/19 0721)  BP: (!)  167/81 (01/12/19 0721)  SpO2: 96 % (01/12/19 0721) Vital Signs (24h Range):  Temp:  [98.7 °F (37.1 °C)-99 °F (37.2 °C)] 99 °F (37.2 °C)  Pulse:  [76-95] 87  Resp:  [7-55] 20  SpO2:  [91 %-100 %] 96 %  BP: (116-212)/() 167/81     Weight: 82.3 kg (181 lb 7 oz)  Body mass index is 27.59 kg/m².    SpO2: 96 %  O2 Device (Oxygen Therapy): nasal cannula w/ humidification      Intake/Output Summary (Last 24 hours) at 1/12/2019 0840  Last data filed at 1/11/2019 2000  Gross per 24 hour   Intake 223.16 ml   Output 300 ml   Net -76.84 ml       Lines/Drains/Airways     Peripheral Intravenous Line                 Peripheral IV - Single Lumen 01/11/19 0801 Left Antecubital 1 day         Peripheral IV - Single Lumen 01/11/19 0950 Left Wrist less than 1 day         Peripheral IV - Single Lumen 01/11/19 0956 Right Hand less than 1 day                Physical Exam   Constitutional: She is oriented to person, place, and time. She appears well-developed and well-nourished.   HENT:   Head: Normocephalic and atraumatic.   Eyes: Conjunctivae and EOM are normal.   Neck: Normal range of motion. Neck supple. No JVD present.   Cardiovascular: Normal rate, regular rhythm and normal heart sounds.   No murmur heard.  Pulmonary/Chest: Effort normal and breath sounds normal. No respiratory distress. She has no wheezes. She has no rales.   Abdominal: Soft. Bowel sounds are normal. She exhibits distension. There is tenderness. There is guarding.   Musculoskeletal: Normal range of motion. She exhibits no edema.   Neurological: She is alert and oriented to person, place, and time.   Skin: Skin is warm and dry. No erythema.   Psychiatric: She has a normal mood and affect. Her behavior is normal. Judgment and thought content normal.   Nursing note and vitals reviewed.      Significant Labs:   BMP:   Recent Labs   Lab 01/11/19  0802 01/11/19  1602 01/12/19  0319   * 381* 360*   * 137 136   K 4.5 3.6 3.7   CL 97 98 97   CO2 28 30* 27    BUN 19 18 18   CREATININE 1.6* 1.4 1.4   CALCIUM 9.1 8.7 8.6*   , CBC   Recent Labs   Lab 01/11/19  0802 01/12/19  0319   WBC 4.88 4.15   HGB 11.0* 10.4*   HCT 34.7* 33.3*    269    and Troponin   Recent Labs   Lab 01/11/19  1046 01/11/19  1602 01/12/19  0319   TROPONINI 0.226* 0.214* 0.211*         Assessment and Plan:       1. Chest pain,   Likely 2nd to  HTN urgerncy, demand ischemic  - Trop so far stable (>> consistent with HTN urgency, less likely LAD Instent thrombosis)  - will control blood pressure,   Restart home medications, DAPT, statin, imdur, norbasc  - will transfer to the floor, now off IV BP medication     2. Abdominal pain  - will c/w PPI, - zofran PRN, will add senna  - tylenol/norco for pain  - will add GI cocktail,  - now left sided, left LLQ pain is worse. Tenderness and guarding is noted.  - will consult Internal medicine to help with abdominal pain work up, defer to IM if GI needs to be involved or not.  - will order abdominal x-ray     3. HTN  - add po medications  - transition to floor today     4 DM  - home medications     Addendum: small pericardial effusion is noted on echo, will give PRN lasix. No tamponade physiology      Active Diagnoses:    Diagnosis Date Noted POA    PRINCIPAL PROBLEM:  Chest pain [R07.9] 01/11/2019 Yes    (HFpEF) heart failure with preserved ejection fraction [I50.30] 12/06/2018 Yes    CKD (chronic kidney disease) stage 3, GFR 30-59 ml/min [N18.3] 12/06/2018 Yes    Hypertensive urgency [I16.0] 08/09/2018 Unknown    Coronary artery disease due to lipid rich plaque.  MATTEO LAD/LCx 12/2015 [I25.10, I25.83] 01/08/2016 Yes    Hyperlipidemia [E78.5] 01/08/2016 Yes    NSTEMI (non-ST elevated myocardial infarction) [I21.4] 12/05/2015 Yes    Hypertension [I10] 08/27/2014 Yes     Chronic    Type 2 diabetes mellitus with complication [E11.8] 08/27/2014 Yes      Problems Resolved During this Admission:       VTE Risk Mitigation (From admission, onward)         Ordered     heparin (porcine) injection 5,000 Units  Every 8 hours      01/12/19 0721     IP VTE HIGH RISK PATIENT  Once      01/12/19 0721          Thank you for the opportunity to care for this patient. Will be available for questions if needed.     Rey Bailey MD  Interventional Cardiology  [facility]  Pager: [phone]

## 2019-01-12 NOTE — ASSESSMENT & PLAN NOTE
KUB showed no obstruction  CT of abdomen pending  Zofran prn nausea   U/A pending  Continue protonix  Pain medication prn

## 2019-01-12 NOTE — PLAN OF CARE
Problem: Adult Inpatient Plan of Care  Goal: Plan of Care Review  Outcome: Ongoing (interventions implemented as appropriate)  Pt received on nasal cannula at   2 lpm.  SPO2   96%.  Pt in no apparent respiratory distress.  Will continue to monitor.

## 2019-01-12 NOTE — HPI
"Patient is a 55 y.o. AAF with pmhx of HFpEF, CAD (NSTEMI s/p stents placed x2 12/15, one MATTEO in 11/2018), HTN, HLD, DM2, Stroke, seizures who presents after referral from [facility] ER. Pt was noted to have chest pain and vague abdominal discomfort.  Cardiology managing chest pain and hypertensive urgency.  Hospital Medicine to Consulted for c/o abdominal pain.  Patient states pain started on Monday (1/7/19) while sleeping in bed, patient states she woke up out of sleep and had episode of nausea and vomiting followed by LLQ abdominal pain and "burning.  Patient denies similar past episodes, pain has intermittent since then, and is now present in LLQ, "burning" and tenderness rated at 6/10, pain reproducible on examination.  Patient endorses pain improves with change in position.  Patient currently denies nausea and vomiting but has had several episodes since hospital admission.  Denies fever, chills, urinary discomfort, flank pain, early satiety, constipation, diarrhea.  Only physical examination patient was very lethargic, easily aroused, blood pressure low, cardiology notified by RN.  GUILLERMO, showed no obstruction, u/a and CT pending.            "

## 2019-01-12 NOTE — PLAN OF CARE
01/12/19 1632   Discharge Assessment   Assessment Type Discharge Planning Assessment   Confirmed/corrected address and phone number on facesheet? Yes   Assessment information obtained from? Patient   Prior to hospitilization cognitive status: Alert/Oriented   Prior to hospitalization functional status: Independent   Current cognitive status: Alert/Oriented   Current Functional Status: Independent   Facility Arrived From: [facility] in [place]   Lives With alone   Able to Return to Prior Arrangements yes   Is patient able to care for self after discharge? Yes   Who are your caregiver(s) and their phone number(s)? Laquita Frye(dtr)[phone]   Patient's perception of discharge disposition home or selfcare   Readmission Within the Last 30 Days no previous admission in last 30 days   Patient currently being followed by outpatient case management? No   Patient currently receives any other outside agency services? No   Equipment Currently Used at Home cane, straight   Do you have any problems affording any of your prescribed medications? No   Is the patient taking medications as prescribed? yes   Does the patient have transportation home? Yes   Transportation Anticipated family or friend will provide   Does the patient receive services at the [facility]? No   Discharge Plan A Home with family   Discharge Plan B Home Health   DME Needed Upon Discharge  (unsure)   Patient/Family in Agreement with Plan yes   Chief Complaint:  Chest pain      HPI:   Ms. Frye is a 54 yo F with pmhx of HFpEF, CAD (NSTEMI s/p stents placed x2 12/15, one MATTEO in 11/2018), HTN, HLD, DM2, Stroke, seizures who presents after referral from [facility] ER. Pt was noted to have chest pain and vague abdominal discomfort. (abd discomfort is acute on chronic). Pt was noted to hav SBP ~ 200 and trop elevation of 0.2.  Pt was transferred to [place] due to NSTEMI due to either hypertensive urgency vs in-stent thrombosis.  Pt underwent LAD PCI  in Nov 2018.  - Pt was put on heparin gtt and nitroglycerin gtt. SBP was better controlled in the 160's. ecg non-ischemic.   - hx of medication non-compliance with plavix in Nov.  When eval pt, she notes still in discomfort. 3-4 / 10. Eating dinner.   Shortly there afterward, pt was noting dry heaves and abdominal discomfort. Bp was better improved. Chest pain also has improved.     The Sw met with the pt to complete the assessment. The pt was transferred from [facility] in [place]. The pt's dtr will transport her home at d/c. The pt's independent with her adl's and uses a straight cane at home. The Sw left her contact info on the white board in the pt's room.

## 2019-01-12 NOTE — CONSULTS
"[facility] Hospital Medicine  Consult Note    Patient Name: Lilly Frye  MRN: [id number]  Admission Date: 1/11/2019  Hospital Length of Stay: 1 days  Attending Physician: Rey Bailey MD   Primary Care Provider: Bud Carmen MD           Patient information was obtained from patient and ER records.     Inpatient consult to [facility]  Consult performed by: Za Simon NP  Consult ordered by: Rey Bailey MD        Subjective:     Principal Problem: Chest pain    Chief Complaint: No chief complaint on file.       HPI: Patient is a 55 y.o. AAF with pmhx of HFpEF, CAD (NSTEMI s/p stents placed x2 12/15, one MATTEO in 11/2018), HTN, HLD, DM2, Stroke, seizures who presents after referral from [facility] ER. Pt was noted to have chest pain and vague abdominal discomfort.  Cardiology managing chest pain and hypertensive urgency.  Hospital Medicine consulted for c/o abdominal pain.  Patient states pain started on Monday (1/7/19) while sleeping in bed, patient states she woke up out of sleep and had episode of nausea and vomiting followed by LLQ abdominal pain and "burning.  Patient denies similar past episodes, pain has been intermittent since then, and is currently present in LLQ, described as "burning" and tenderness rated at 6/10, pain reproducible on examination. Patient denies aggravating factors and states pain improves with change in position.  Currently denies nausea and vomiting but has had several episodes since hospital admission.  Denies fever, chills, urinary discomfort, flank pain, early satiety, reflux, constipation, diarrhea.  Patient denies having colonoscopy or EGD in past.  On physical examination patient was very lethargic, easily aroused, blood pressure low, cardiology notified by RN.  GUILLERMO, showed no obstruction, u/a and CT pending.              Past Medical History:   Diagnosis Date    MAGO (acute kidney injury) 10/26/2018    Anemia of chronic disease " 11/7/2018    Arthritis     Cataract     Diabetes mellitus, type 2     Foul smelling urine 12/5/2017    GERD (gastroesophageal reflux disease)     Heart attack     Hypertension     Hypertensive emergency 10/24/2018    Hypertensive urgency 8/9/2018    Joint pain     NSTEMI (non-ST elevated myocardial infarction) 12/5/2015    S/P coronary artery stent placement     2 stents 12/2015    Seizures     Stroke 2015    about 3-4 with light right leg weakness       Past Surgical History:   Procedure Laterality Date    ANGIOGRAM, CORONARY ARTERY N/A 11/13/2018    [facility]formed [facility] CATH LAB/EP    CATARACT EXTRACTION Bilateral 09/12/2017    COLONOSCOPY  04/05/2007    COLONOSCOPY N/A 9/18/2018    [facility] ENDO    CORONARY ANGIOPLASTY WITH STENT PLACEMENT      EXTRACTION-CATARACT-IOL Left 9/12/2017    Performed by [facility]ney [facility] at [facility] OR    EXTRACTION-CATARACT-IOL Right 2/14/2017    Per[facility] at [facility] OR    HEART CATH-LEFT N/A 12/7/2015    Performed by Franki [facility]rigez [facility] at [facility] CATH LAB    IVUS, Coronary  11/13/2018    [facility] CATH LAB/EP    Left heart cath Left 11/13/2018    [facility] CATH LAB/EP    Left heart cath Left 11/12/2018    Performed by Sukumar Diaz MD at [facility] CATH LAB    REPAIR- RETINA ANTERIOR Right 2/14/2017    Performed by [facility] at [facility] OR    TUBAL LIGATION         Review of patient's allergies indicates:  No Known Allergies    Current Facility-Administered Medications on File Prior to Encounter   Medication    [DISCONTINUED] heparin 25,000 units in dextrose 5% (100 units/ml) IV bolus from bag - ADDITIONAL PRN BOLUS - 30 units/kg (max bolus 4000 units)    [DISCONTINUED] heparin 25,000 units in dextrose 5% (100 units/ml) IV bolus from bag - ADDITIONAL PRN BOLUS - 60 units/kg (max bolus 4000 units)     [DISCONTINUED] heparin 25,000 units in dextrose 5% 250 mL (100 units/mL) infusion LOW INTENSITY nomogram - [facility]    [DISCONTINUED] nitroGLYCERIN 50 mg in dextrose 5 % 250 mL infusion (TITRATING)     Current Outpatient Medications on File Prior to Encounter   Medication Sig    acetaminophen (TYLENOL) 325 MG tablet Take 2 tablets (650 mg total) by mouth every 6 (six) hours as needed for Pain.    ammonium lactate 12 % Crea Apply to the body at least twice daily    aspirin (ECOTRIN) 81 MG EC tablet Take 1 tablet (81 mg total) by mouth once daily.    atorvastatin (LIPITOR) 80 MG tablet TAKE ONE TABLET BY MOUTH ONCE DAILY    betamethasone dipropionate (DIPROLENE) 0.05 % cream Apply topically 2 (two) times daily.    blood sugar diagnostic Strp 1 strip by Misc.(Non-Drug; Combo Route) route 3 (three) times daily.    blood-glucose meter (RELION ALL-IN-ONE METER) kit Use as instructed    brimonidine 0.2% (ALPHAGAN) 0.2 % Drop Place 1 drop into the right eye 3 (three) times daily.    carvedilol (COREG) 25 MG tablet Take 1 tablet (25 mg total) by mouth 2 (two) times daily with meals.    cetirizine (ZYRTEC) 10 MG tablet Take 1 tablet (10 mg total) by mouth every morning.    clopidogrel (PLAVIX) 75 mg tablet Take 1 tablet (75 mg total) by mouth once daily.    dorzolamide-timolol 2-0.5% (COSOPT) 22.3-6.8 mg/mL ophthalmic solution Place 1 drop into the right eye 2 (two) times daily.    fish oil-omega-3 fatty acids 300-1,000 mg capsule Take 2 capsules (2 g total) by mouth once daily.    FLUoxetine (PROZAC) 20 MG capsule Take 1 capsule (20 mg total) by mouth once daily.    furosemide (LASIX) 40 MG tablet Take 1 tablet (40 mg total) by mouth 2 (two) times daily.    gabapentin (NEURONTIN) 300 MG capsule Take 1 capsule (300 mg total) by mouth 3 (three) times daily.    hydrOXYzine HCl (ATARAX) 25 MG tablet     insulin aspart U-100 (NOVOLOG) 100 unit/mL InPn pen Inject 14 Units into the skin 3 (three) times daily with  "meals.    lancets (ONETOUCH ULTRASOFT LANCETS) Misc 1 application by Misc.(Non-Drug; Combo Route) route 3 (three) times daily.    latanoprost (XALATAN) 0.005 % ophthalmic solution Place 1 drop into the right eye once daily.    LEVEMIR FLEXTOUCH U-100 INSULN 100 unit/mL (3 mL) InPn pen Inject 35 Units into the skin every evening.    levETIRAcetam (KEPPRA) 500 MG Tab Take 2 tablets (1,000 mg total) by mouth 2 (two) times daily. 2 tablets twice per day    losartan (COZAAR) 50 MG tablet TAKE ONE TABLET BY MOUTH IN THE EVENING    metFORMIN (GLUCOPHAGE) 500 MG tablet Take 1 tablet (500 mg total) by mouth 2 (two) times daily with meals. 1/2 tablet BID if you develop loose stools that don't improve in 1week    nitroGLYCERIN (NITROSTAT) 0.4 MG SL tablet Place 1 tablet (0.4 mg total) under the tongue every 5 (five) minutes as needed for Chest pain.    nystatin (MYCOSTATIN) powder Apply topically 2 (two) times daily.    pen needle, diabetic 32 gauge x 5/32" Ndle 1 each by Misc.(Non-Drug; Combo Route) route 2 (two) times daily with meals.    polyvinyl alcohol, artificial tears, (LIQUIFILM TEARS) 1.4 % ophthalmic solution 1 drop as needed.    triamcinolone acetonide 0.1% (KENALOG) 0.1 % cream Apply topically 2 (two) times daily. Thin amount to the skin on the arms and legs     Family History     Problem Relation (Age of Onset)    Cancer Father    Diabetes Daughter, Sister    Hypertension Daughter    No Known Problems Mother    Stomach cancer Father        Tobacco Use    Smoking status: Never Smoker    Smokeless tobacco: Never Used   Substance and Sexual Activity    Alcohol use: No    Drug use: No    Sexual activity: No     Birth control/protection: Surgical     Review of Systems   Constitutional: Positive for appetite change and fatigue. Negative for chills.   HENT: Negative for congestion, postnasal drip, rhinorrhea and sore throat.    Eyes: Negative for visual disturbance.   Respiratory: Negative for chest " "tightness and shortness of breath.    Cardiovascular: Negative for chest pain.   Gastrointestinal: Positive for abdominal pain (LLQ "Burning"), nausea and vomiting.   Genitourinary: Negative for difficulty urinating.   Musculoskeletal: Negative for arthralgias, back pain, gait problem, myalgias and neck pain.   Skin: Negative for color change.   Neurological: Negative for dizziness, syncope, light-headedness and headaches.   Hematological: Does not bruise/bleed easily.   Psychiatric/Behavioral: Negative for agitation and confusion.     Objective:     Vital Signs (Most Recent):  Temp: 96.6 °F (35.9 °C) (01/12/19 1139)  Pulse: 67 (01/12/19 1200)  Resp: 18 (01/12/19 1139)  BP: 100/60 (01/12/19 1139)  SpO2: 96 % (01/12/19 1155) Vital Signs (24h Range):  Temp:  [96.6 °F (35.9 °C)-99 °F (37.2 °C)] 96.6 °F (35.9 °C)  Pulse:  [67-89] 67  Resp:  [7-32] 18  SpO2:  [91 %-99 %] 96 %  BP: ()/() 100/60     Weight: 82.3 kg (181 lb 7 oz)  Body mass index is 27.59 kg/m².    Physical Exam   Constitutional: She is oriented to person, place, and time. She appears well-developed and well-nourished.   HENT:   Head: Normocephalic.   Eyes: Pupils are equal, round, and reactive to light.   Neck: Normal range of motion.   Cardiovascular: Normal rate and regular rhythm.   Pulmonary/Chest: Effort normal and breath sounds normal.   Abdominal: Soft. She exhibits no distension. There is tenderness (LLQ).   Musculoskeletal: Normal range of motion.   Neurological: She is oriented to person, place, and time.   Lethargic, easy to arouse   Skin: Skin is warm and dry.   Psychiatric: She has a normal mood and affect.       Significant Labs:   CBC:   Recent Labs   Lab 01/11/19  0802 01/12/19  0319   WBC 4.88 4.15   HGB 11.0* 10.4*   HCT 34.7* 33.3*    269     CMP:   Recent Labs   Lab 01/11/19  0802 01/11/19  1602 01/12/19  0319   * 137 136   K 4.5 3.6 3.7   CL 97 98 97   CO2 28 30* 27   * 381* 360*   BUN 19 18 18 "   CREATININE 1.6* 1.4 1.4   CALCIUM 9.1 8.7 8.6*   PROT 7.0  --   --    ALBUMIN 2.4*  --   --    BILITOT 0.6  --   --    ALKPHOS 109  --   --    AST 35  --   --    ALT 38  --   --    ANIONGAP 9 9 12   EGFRNONAA 35.0* 41* 41*       Significant Imaging: I have reviewed and interpreted all pertinent imaging results/findings within the past 24 hours.    Assessment/Plan:     Abdominal pain    KUB showed no obstruction  CT of abdomen pending  Zofran prn nausea   U/A pending  Continue protonix           VTE Risk Mitigation (From admission, onward)        Ordered     heparin (porcine) injection 5,000 Units  Every 8 hours      01/12/19 0721     IP VTE HIGH RISK PATIENT  Once      01/12/19 0721              Thank you for your consult. I will follow-up with patient. Please contact us if you have any additional questions.    Za Simon NP  Department of Hospital Medicine   [facility]

## 2019-01-13 PROBLEM — I16.0 HYPERTENSIVE URGENCY: Status: RESOLVED | Noted: 2018-08-09 | Resolved: 2019-01-13

## 2019-01-13 PROBLEM — N39.0 UTI (URINARY TRACT INFECTION): Status: ACTIVE | Noted: 2019-01-13

## 2019-01-13 PROBLEM — R10.9 ABDOMINAL PAIN: Status: RESOLVED | Noted: 2019-01-12 | Resolved: 2019-01-13

## 2019-01-13 LAB
AMMONIA PLAS-SCNC: 34 UMOL/L
AMORPH CRY URNS QL MICRO: ABNORMAL
ANION GAP SERPL CALC-SCNC: 6 MMOL/L
BACTERIA #/AREA URNS HPF: ABNORMAL /HPF
BASOPHILS # BLD AUTO: 0 K/UL
BASOPHILS NFR BLD: 0 %
BILIRUB UR QL STRIP: NEGATIVE
BUN SERPL-MCNC: 20 MG/DL
CALCIUM SERPL-MCNC: 8.5 MG/DL
CHLORIDE SERPL-SCNC: 95 MMOL/L
CLARITY UR: CLEAR
CO2 SERPL-SCNC: 33 MMOL/L
COLOR UR: YELLOW
CREAT SERPL-MCNC: 1.8 MG/DL
DIFFERENTIAL METHOD: ABNORMAL
EOSINOPHIL # BLD AUTO: 0 K/UL
EOSINOPHIL NFR BLD: 0.5 %
ERYTHROCYTE [DISTWIDTH] IN BLOOD BY AUTOMATED COUNT: 13.6 %
EST. GFR  (AFRICAN AMERICAN): 35 ML/MIN/1.73 M^2
EST. GFR  (NON AFRICAN AMERICAN): 30 ML/MIN/1.73 M^2
GLUCOSE SERPL-MCNC: 243 MG/DL
GLUCOSE UR QL STRIP: ABNORMAL
HCT VFR BLD AUTO: 31.5 %
HGB BLD-MCNC: 9.8 G/DL
HGB UR QL STRIP: ABNORMAL
HYALINE CASTS #/AREA URNS LPF: ABNORMAL /LPF
KETONES UR QL STRIP: NEGATIVE
LEUKOCYTE ESTERASE UR QL STRIP: NEGATIVE
LYMPHOCYTES # BLD AUTO: 1.1 K/UL
LYMPHOCYTES NFR BLD: 26.6 %
MCH RBC QN AUTO: 27.7 PG
MCHC RBC AUTO-ENTMCNC: 31.1 G/DL
MCV RBC AUTO: 89 FL
MICROSCOPIC COMMENT: ABNORMAL
MONOCYTES # BLD AUTO: 0.3 K/UL
MONOCYTES NFR BLD: 8.3 %
NEUTROPHILS # BLD AUTO: 2.7 K/UL
NEUTROPHILS NFR BLD: 64.6 %
NITRITE UR QL STRIP: NEGATIVE
PH UR STRIP: 6 [PH] (ref 5–8)
PLATELET # BLD AUTO: 268 K/UL
PMV BLD AUTO: 10.7 FL
POCT GLUCOSE: 164 MG/DL (ref 70–110)
POCT GLUCOSE: 188 MG/DL (ref 70–110)
POCT GLUCOSE: 192 MG/DL (ref 70–110)
POCT GLUCOSE: 257 MG/DL (ref 70–110)
POTASSIUM SERPL-SCNC: 3.6 MMOL/L
PROT UR QL STRIP: ABNORMAL
RBC # BLD AUTO: 3.54 M/UL
RBC #/AREA URNS HPF: 1 /HPF (ref 0–4)
RBC CASTS #/AREA URNS LPF: 5 /LPF
SODIUM SERPL-SCNC: 134 MMOL/L
SP GR UR STRIP: 1.02 (ref 1–1.03)
SQUAMOUS #/AREA URNS HPF: 1 /HPF
TROPONIN I SERPL DL<=0.01 NG/ML-MCNC: 0.15 NG/ML
URN SPEC COLLECT METH UR: ABNORMAL
UROBILINOGEN UR STRIP-ACNC: NEGATIVE EU/DL
WAXY CASTS #/AREA URNS LPF: 1 /LPF
WBC # BLD AUTO: 4.1 K/UL
WBC #/AREA URNS HPF: 15 /HPF (ref 0–5)

## 2019-01-13 PROCEDURE — 36415 COLL VENOUS BLD VENIPUNCTURE: CPT

## 2019-01-13 PROCEDURE — 63600175 PHARM REV CODE 636 W HCPCS: Performed by: STUDENT IN AN ORGANIZED HEALTH CARE EDUCATION/TRAINING PROGRAM

## 2019-01-13 PROCEDURE — 93010 EKG 12-LEAD: ICD-10-PCS | Mod: ,,, | Performed by: INTERNAL MEDICINE

## 2019-01-13 PROCEDURE — 80048 BASIC METABOLIC PNL TOTAL CA: CPT

## 2019-01-13 PROCEDURE — 94761 N-INVAS EAR/PLS OXIMETRY MLT: CPT

## 2019-01-13 PROCEDURE — 27000221 HC OXYGEN, UP TO 24 HOURS

## 2019-01-13 PROCEDURE — 93005 ELECTROCARDIOGRAM TRACING: CPT

## 2019-01-13 PROCEDURE — 87086 URINE CULTURE/COLONY COUNT: CPT

## 2019-01-13 PROCEDURE — 25000003 PHARM REV CODE 250: Performed by: NURSE PRACTITIONER

## 2019-01-13 PROCEDURE — 99233 SBSQ HOSP IP/OBS HIGH 50: CPT | Mod: ,,, | Performed by: STUDENT IN AN ORGANIZED HEALTH CARE EDUCATION/TRAINING PROGRAM

## 2019-01-13 PROCEDURE — 93010 ELECTROCARDIOGRAM REPORT: CPT | Mod: ,,, | Performed by: INTERNAL MEDICINE

## 2019-01-13 PROCEDURE — 84484 ASSAY OF TROPONIN QUANT: CPT

## 2019-01-13 PROCEDURE — 81000 URINALYSIS NONAUTO W/SCOPE: CPT

## 2019-01-13 PROCEDURE — 12000002 HC ACUTE/MED SURGE SEMI-PRIVATE ROOM

## 2019-01-13 PROCEDURE — 85025 COMPLETE CBC W/AUTO DIFF WBC: CPT

## 2019-01-13 PROCEDURE — 99233 PR SUBSEQUENT HOSPITAL CARE,LEVL III: ICD-10-PCS | Mod: ,,, | Performed by: STUDENT IN AN ORGANIZED HEALTH CARE EDUCATION/TRAINING PROGRAM

## 2019-01-13 PROCEDURE — 25000003 PHARM REV CODE 250: Performed by: STUDENT IN AN ORGANIZED HEALTH CARE EDUCATION/TRAINING PROGRAM

## 2019-01-13 PROCEDURE — 82140 ASSAY OF AMMONIA: CPT

## 2019-01-13 RX ORDER — CIPROFLOXACIN 250 MG/1
250 TABLET, FILM COATED ORAL EVERY 12 HOURS
Qty: 10 TABLET | Refills: 0 | Status: SHIPPED | OUTPATIENT
Start: 2019-01-13 | End: 2019-01-22 | Stop reason: ALTCHOICE

## 2019-01-13 RX ORDER — CARVEDILOL 12.5 MG/1
12.5 TABLET ORAL 2 TIMES DAILY WITH MEALS
Qty: 60 TABLET | Refills: 6 | Status: SHIPPED | OUTPATIENT
Start: 2019-01-13 | End: 2019-02-01 | Stop reason: SDUPTHER

## 2019-01-13 RX ORDER — CIPROFLOXACIN 250 MG/1
250 TABLET, FILM COATED ORAL EVERY 12 HOURS
Status: DISCONTINUED | OUTPATIENT
Start: 2019-01-13 | End: 2019-01-14 | Stop reason: HOSPADM

## 2019-01-13 RX ORDER — PANTOPRAZOLE SODIUM 40 MG/1
40 TABLET, DELAYED RELEASE ORAL DAILY
Qty: 30 TABLET | Refills: 11 | Status: SHIPPED | OUTPATIENT
Start: 2019-01-14 | End: 2020-01-01

## 2019-01-13 RX ORDER — AMLODIPINE BESYLATE 5 MG/1
5 TABLET ORAL DAILY
Qty: 30 TABLET | Refills: 11 | Status: SHIPPED | OUTPATIENT
Start: 2019-01-14 | End: 2019-01-01 | Stop reason: SDUPTHER

## 2019-01-13 RX ORDER — ISOSORBIDE MONONITRATE 30 MG/1
30 TABLET, EXTENDED RELEASE ORAL DAILY
Qty: 30 TABLET | Refills: 11 | Status: SHIPPED | OUTPATIENT
Start: 2019-01-14 | End: 2019-02-01 | Stop reason: SDUPTHER

## 2019-01-13 RX ORDER — GABAPENTIN 300 MG/1
300 CAPSULE ORAL NIGHTLY
Status: DISCONTINUED | OUTPATIENT
Start: 2019-01-14 | End: 2019-01-13

## 2019-01-13 RX ORDER — CIPROFLOXACIN 500 MG/1
500 TABLET ORAL EVERY 12 HOURS
Status: DISCONTINUED | OUTPATIENT
Start: 2019-01-13 | End: 2019-01-13

## 2019-01-13 RX ORDER — CLOPIDOGREL BISULFATE 75 MG/1
75 TABLET ORAL DAILY
Qty: 30 TABLET | Refills: 11 | Status: SHIPPED | OUTPATIENT
Start: 2019-01-14 | End: 2019-01-22 | Stop reason: SDUPTHER

## 2019-01-13 RX ADMIN — INSULIN ASPART 2 UNITS: 100 INJECTION, SOLUTION INTRAVENOUS; SUBCUTANEOUS at 12:01

## 2019-01-13 RX ADMIN — AMLODIPINE BESYLATE 5 MG: 5 TABLET ORAL at 09:01

## 2019-01-13 RX ADMIN — CIPROFLOXACIN HYDROCHLORIDE 250 MG: 250 TABLET, FILM COATED ORAL at 09:01

## 2019-01-13 RX ADMIN — LEVETIRACETAM 1000 MG: 500 TABLET ORAL at 09:01

## 2019-01-13 RX ADMIN — HEPARIN SODIUM 5000 UNITS: 5000 INJECTION, SOLUTION INTRAVENOUS; SUBCUTANEOUS at 09:01

## 2019-01-13 RX ADMIN — ACETAMINOPHEN 650 MG: 325 TABLET ORAL at 06:01

## 2019-01-13 RX ADMIN — INSULIN ASPART 3 UNITS: 100 INJECTION, SOLUTION INTRAVENOUS; SUBCUTANEOUS at 09:01

## 2019-01-13 RX ADMIN — INSULIN ASPART 2 UNITS: 100 INJECTION, SOLUTION INTRAVENOUS; SUBCUTANEOUS at 05:01

## 2019-01-13 RX ADMIN — PANTOPRAZOLE SODIUM 40 MG: 40 TABLET, DELAYED RELEASE ORAL at 09:01

## 2019-01-13 RX ADMIN — CLOPIDOGREL BISULFATE 75 MG: 75 TABLET ORAL at 09:01

## 2019-01-13 RX ADMIN — CARVEDILOL 6.25 MG: 6.25 TABLET, FILM COATED ORAL at 09:01

## 2019-01-13 RX ADMIN — ATORVASTATIN CALCIUM 80 MG: 40 TABLET, FILM COATED ORAL at 09:01

## 2019-01-13 RX ADMIN — ASPIRIN 81 MG: 81 TABLET, COATED ORAL at 09:01

## 2019-01-13 RX ADMIN — HEPARIN SODIUM 5000 UNITS: 5000 INJECTION, SOLUTION INTRAVENOUS; SUBCUTANEOUS at 06:01

## 2019-01-13 RX ADMIN — FLUOXETINE HYDROCHLORIDE 20 MG: 20 CAPSULE ORAL at 09:01

## 2019-01-13 RX ADMIN — INSULIN DETEMIR 20 UNITS: 100 INJECTION, SOLUTION SUBCUTANEOUS at 09:01

## 2019-01-13 RX ADMIN — HEPARIN SODIUM 5000 UNITS: 5000 INJECTION, SOLUTION INTRAVENOUS; SUBCUTANEOUS at 02:01

## 2019-01-13 RX ADMIN — GABAPENTIN 300 MG: 300 CAPSULE ORAL at 09:01

## 2019-01-13 RX ADMIN — ISOSORBIDE MONONITRATE 30 MG: 30 TABLET, EXTENDED RELEASE ORAL at 09:01

## 2019-01-13 NOTE — PLAN OF CARE
Problem: Adult Inpatient Plan of Care  Goal: Plan of Care Review  Outcome: Ongoing (interventions implemented as appropriate)  Pt received on nasal cannula at   2 lpm.  SPO2   95%.  Pt in no apparent respiratory distress.  Will continue to monitor.

## 2019-01-13 NOTE — PLAN OF CARE
Pt has been lethargic through out day, refusing food, and continues to complain about abdominal pain. Pt is due to void. MD notified.

## 2019-01-13 NOTE — PLAN OF CARE
Problem: Adult Inpatient Plan of Care  Goal: Plan of Care Review  Outcome: Ongoing (interventions implemented as appropriate)  Plan of care reviewed with patient's daughter and pt. Patient and daughter voiced understanding. Pt has slowly become more alert and has managed to stay awake to speak coherently after gabapentin was held. NSR with occasional PVCs on monitor. No acute distress noted. Pt up in chair, call bell within reach, pt advised to call for assistance. Daughters and grandchildren at bedside.

## 2019-01-13 NOTE — HOSPITAL COURSE
1/13/18  Patient still reports intermittent, burning pain, rated at 5/10 tor LLQ and mid abdominal area, KUB and CT of abdomen negative for abdominal findings, U/A positive, pending cultures, started po Cipro, continue Protonix, patient can follow up with pcp for further management.

## 2019-01-13 NOTE — PLAN OF CARE
Discharge orders noted, no HH or HME ordered.    Future Appointments   Date Time Provider Department Center   1/14/2019  2:00 PM OPHTHALMOLOGY TESTING, [facility] OPHTHAL ALEX GREEN   1/17/2019  8:00 AM OPHTHALMOLOGY TESTING, [facility] OPHTHAL ALEX GREEN   1/17/2019  9:00 AM OPHTHALMOLOGY GLAUCOMA, [facility] OPHTHAL ALEX GREEN   1/24/2019 10:00 AM UROLOGY CLINIC [facility] UROLOGY ALEX GOLD   1/24/2019  1:30 PM Jaylan Rivera MD [facility] RHEUM ALEX ACT   5/21/2019  9:00 AM Christos Lamas MD [facility] CCCLIN ALEX [facility]   6/6/2019 10:00 AM Kenyon Lopez MD [facility] CARDIO [address]       Pt's nurse will go over medications/signs and symptoms prior to discharge       01/13/19 1052   Final Note   Assessment Type Final Discharge Note   Anticipated Discharge Disposition Home   What phone number can be called within the next 1-3 days to see how you are doing after discharge? [phone]   Hospital Follow Up  Appt(s) scheduled? No  (Offices closed for weekend. Patient to schedule own follow up appointment.)   Right Care Referral Info   Post Acute Recommendation No Care     Jocelyne Davies, RN Transitional Navigator  [phone]

## 2019-01-13 NOTE — PROGRESS NOTES
[facility]  Cardiology  Progress Note    Patient Name: Lilly Frye  MRN: [id number]  Admission Date: 1/11/2019  Hospital Length of Stay: 2 days  Code Status: Prior   Attending Physician: Rey Bailey MD   Primary Care Physician: Bud Carmen MD  Expected Discharge Date: 1/13/2019  Principal Problem:Chest pain    Subjective:   Ms. Frye is a 56 yo F with pmhx of HFpEF, CAD (NSTEMI s/p stents placed x2 12/15, one MATTEO in 11/2018), HTN, HLD, DM2, Stroke, seizures who presents after referral from [facility] ER. Pt was noted to have chest pain and vague abdominal discomfort. (abd discomfort is acute on chronic). Pt was noted to hav SBP ~ 200 and trop elevation of 0.2.  Pt was transferred to [place] due to NSTEMI due to either hypertensive urgency vs in-stent thrombosis.  Pt underwent LAD PCI in Nov 2018.  - Pt was put on heparin gtt and nitroglycerin gtt. SBP was better controlled in the 160's. ecg non-ischemic.   - hx of medication non-compliance with plavix in Nov.  When eval pt, she notes still in discomfort. 3-4 / 10. Eating dinner.   Shortly there afterward, pt was noting dry heaves and abdominal discomfort. Bp was better improved. Chest pain also has improved.    1/12/19:  Chest pain is resolved. Continues to c/o left sided abdominal pain. Unable to tolerate most of her dinner. Norco help a little with her pain. No BM since hospital admit.  Trop and ecg stable.    1/13/19: Pt is sleepy this am. Trop is now down trending, HTN is better controlled. Pt Rn staff, pt is not as alert as previous day. >500cc of urine in am bladder. Agree for in/out stevenson and to collect UA.  Tentative to discharge home later today...    ** pt is now see dead people per RN staff. Give cipro for UTI. Unknown reason y pt is delirious.**     Review of Systems   Unable to perform ROS: Mental status change   Constitutional: Negative for chills, malaise/fatigue and weight loss.   Respiratory: Negative for cough and  shortness of breath.    Cardiovascular: Negative for chest pain.   Gastrointestinal: Positive for abdominal pain, nausea and vomiting.   Musculoskeletal: Positive for myalgias.     Objective:     Vital Signs (Most Recent):  Temp: 96.9 °F (36.1 °C) (01/13/19 1153)  Pulse: 73 (01/13/19 1200)  Resp: 18 (01/13/19 1153)  BP: (!) 164/87 (01/13/19 1153)  SpO2: 95 % (01/13/19 0802) Vital Signs (24h Range):  Temp:  [96.9 °F (36.1 °C)-100.1 °F (37.8 °C)] 96.9 °F (36.1 °C)  Pulse:  [69-80] 73  Resp:  [16-18] 18  SpO2:  [95 %] 95 %  BP: ()/(54-87) 164/87     Weight: 82.3 kg (181 lb 7 oz)  Body mass index is 27.59 kg/m².    SpO2: 95 %  O2 Device (Oxygen Therapy): nasal cannula      Intake/Output Summary (Last 24 hours) at 1/13/2019 1540  Last data filed at 1/13/2019 0800  Gross per 24 hour   Intake 250 ml   Output 600 ml   Net -350 ml       Lines/Drains/Airways     Peripheral Intravenous Line                 Peripheral IV - Single Lumen 01/11/19 0801 Left Antecubital 2 days         Peripheral IV - Single Lumen 01/11/19 0950 Left Wrist 2 days         Peripheral IV - Single Lumen 01/11/19 0956 Right Hand 2 days                Physical Exam   Constitutional: She appears well-developed and well-nourished.   HENT:   Head: Normocephalic and atraumatic.   Eyes: Conjunctivae and EOM are normal.   Neck: Normal range of motion. Neck supple. No JVD present.   Cardiovascular: Normal rate, regular rhythm and normal heart sounds.   No murmur heard.  Pulmonary/Chest: Effort normal and breath sounds normal. No respiratory distress. She has no wheezes. She has no rales.   Abdominal: Soft. Bowel sounds are normal. She exhibits distension. There is tenderness. There is guarding.   Musculoskeletal: Normal range of motion. She exhibits no edema.   Skin: Skin is warm and dry. No erythema.   Psychiatric: She has a normal mood and affect. Her behavior is normal. Judgment and thought content normal.   Nursing note and vitals  reviewed.      Significant Labs:   BMP:   Recent Labs   Lab 01/11/19  1602 01/12/19  0319 01/13/19  0520   * 360* 243*    136 134*   K 3.6 3.7 3.6   CL 98 97 95   CO2 30* 27 33*   BUN 18 18 20   CREATININE 1.4 1.4 1.8*   CALCIUM 8.7 8.6* 8.5*   , CBC   Recent Labs   Lab 01/12/19  0319 01/13/19  0520   WBC 4.15 4.10   HGB 10.4* 9.8*   HCT 33.3* 31.5*    268    and Troponin   Recent Labs   Lab 01/11/19  1602 01/12/19  0319 01/13/19  0520   TROPONINI 0.214* 0.211* 0.148*         Assessment and Plan:       1. AMS, ? delirum  - is this due to ?narcotics, vs PRESS vs complications of UTI, - appreciate IM recs  - hold narcotics now  -? CT head, will place order  - if delirum persist to tomorrow, likely will transfer care to IM/neuro  - will hold discharge home due to this AMS now...    2.Chest pain, - resolved  Likely 2nd to  HTN urgerncy, demand ischemic  - Trop so far stable (>> consistent with HTN urgency, less likely LAD Instent thrombosis)  - will control blood pressure,   Restart home medications, DAPT, statin, imdur, norvasc     3. Abdominal pain  - now left sided, left LLQ pain is worse. Tenderness and guarding is noted  - CT abd is WNL, no acute process  - appreciate IM recs.  - will c/w PPI, - zofran PRN, will add senna  - UA is c/w UTI, IM recommend cipro    4. HTN- now controlled  - norvasc, imdur, had to come down on coreg  - pt is like non-compliant with home medication still bp is relative stable on low-med dose medicatoins     5 DM  - home medications     Previously was planned for discharge home today but pt's clinic condition has changed.      Active Diagnoses:    Diagnosis Date Noted POA    PRINCIPAL PROBLEM:  Chest pain [R07.9] 01/11/2019 Yes    UTI (urinary tract infection) [N39.0] 01/13/2019 Yes    (HFpEF) heart failure with preserved ejection fraction [I50.30] 12/06/2018 Yes    CKD (chronic kidney disease) stage 3, GFR 30-59 ml/min [N18.3] 12/06/2018 Yes     Chronic     Hyperlipidemia [E78.5] 01/08/2016 Yes    Hypertension [I10] 08/27/2014 Yes     Chronic    Type 2 diabetes mellitus with hyperglycemia, without long-term current use of insulin [E11.65] 08/27/2014 Yes     Chronic      Problems Resolved During this Admission:    Diagnosis Date Noted Date Resolved POA    Abdominal pain [R10.9] 01/12/2019 01/13/2019 Yes    Hypertensive urgency [I16.0] 08/09/2018 01/13/2019 Unknown    Coronary artery disease due to lipid rich plaque.  MATTEO LAD/LCx 12/2015 [I25.10, I25.83] 01/08/2016 01/13/2019 Yes    NSTEMI (non-ST elevated myocardial infarction) [I21.4] 12/05/2015 01/13/2019 Yes       VTE Risk Mitigation (From admission, onward)        Ordered     heparin (porcine) injection 5,000 Units  Every 8 hours      01/12/19 0721     IP VTE HIGH RISK PATIENT  Once      01/12/19 0721          Thank you for the opportunity to care for this patient. Will be available for questions if needed.     Rey Bailey MD  Interventional Cardiology  [facility]  Pager: [phone]

## 2019-01-13 NOTE — PLAN OF CARE
Problem: Adult Inpatient Plan of Care  Goal: Plan of Care Review  Outcome: Ongoing (interventions implemented as appropriate)   01/13/19 0500   Plan of Care Review   Plan of Care Reviewed With patient   POC reviewed with pt, verbalized understanding.  Looks tired, responds to voice, disoriented to time and place.  No complaint of pain or any other distress noted throughout shift.  On continuous telemetry monitor, NSR.  No red alarm noted.  Encouraged to turn.  On 2L oxygen via NC.  Blood glucose monitored, insulin given followed order.  Safety maintained, free of falls throughout shift.  Instructed to call for any assistance.  WIll continue to monitor.

## 2019-01-13 NOTE — ASSESSMENT & PLAN NOTE
"Abdominal Pain   KUB and CT negative for acute abdominal findings  UA positive for UTI, start Cipro,pending culture results   Continue Protonix, patient describes pain as "burning", suspicious for gastritis  Patient can follow up with PCP for further management      "

## 2019-01-13 NOTE — SUBJECTIVE & OBJECTIVE
"Interval History: AAOx4, answers question appropriately, still complaining of "burning" abdominal pain, U/A positive for UTI, cipro started.  Review of Systems   Constitutional: Negative for appetite change, chills, fatigue and fever.   HENT: Negative for congestion, postnasal drip, rhinorrhea and sore throat.    Eyes: Negative for visual disturbance.   Respiratory: Negative for chest tightness and shortness of breath.    Cardiovascular: Negative for chest pain.   Gastrointestinal: Positive for abdominal pain ("burning" pain LLQ and mid abdominal area). Negative for abdominal distention.   Genitourinary: Negative for difficulty urinating and flank pain.   Musculoskeletal: Negative for back pain, gait problem and neck stiffness.   Skin: Negative for color change.   Neurological: Negative for weakness, light-headedness and headaches.   Hematological: Does not bruise/bleed easily.   Psychiatric/Behavioral: Negative for agitation and confusion.     Objective:     Vital Signs (Most Recent):  Temp: 98.9 °F (37.2 °C) (01/13/19 0814)  Pulse: 78 (01/13/19 0814)  Resp: 18 (01/13/19 0814)  BP: (!) 161/79 (01/13/19 0814)  SpO2: 95 % (01/13/19 0802) Vital Signs (24h Range):  Temp:  [96.6 °F (35.9 °C)-100.1 °F (37.8 °C)] 98.9 °F (37.2 °C)  Pulse:  [67-80] 78  Resp:  [16-18] 18  SpO2:  [95 %-96 %] 95 %  BP: ()/(54-79) 161/79     Weight: 82.3 kg (181 lb 7 oz)  Body mass index is 27.59 kg/m².    Intake/Output Summary (Last 24 hours) at 1/13/2019 1020  Last data filed at 1/13/2019 0800  Gross per 24 hour   Intake 250 ml   Output 600 ml   Net -350 ml      Physical Exam   Constitutional: She is oriented to person, place, and time. She appears well-developed and well-nourished.   HENT:   Head: Normocephalic.   Eyes: Pupils are equal, round, and reactive to light.   Neck: Normal range of motion.   Cardiovascular: Normal rate and regular rhythm.   Pulmonary/Chest: Effort normal and breath sounds normal.   Abdominal: Soft. Bowel " sounds are normal. She exhibits no mass. There is no tenderness. There is no rebound.   Musculoskeletal: Normal range of motion.   Neurological: She is alert and oriented to person, place, and time.   Skin: Skin is warm and dry.   Psychiatric: She has a normal mood and affect.       Significant Labs:   CBC:   Recent Labs   Lab 01/12/19  0319 01/13/19  0520   WBC 4.15 4.10   HGB 10.4* 9.8*   HCT 33.3* 31.5*    268     CMP:   Recent Labs   Lab 01/11/19  1602 01/12/19  0319 01/13/19  0520    136 134*   K 3.6 3.7 3.6   CL 98 97 95   CO2 30* 27 33*   * 360* 243*   BUN 18 18 20   CREATININE 1.4 1.4 1.8*   CALCIUM 8.7 8.6* 8.5*   ANIONGAP 9 12 6*   EGFRNONAA 41* 41* 30*     Urine Studies:   Recent Labs   Lab 01/13/19  0753   COLORU Yellow   APPEARANCEUA Clear   PHUR 6.0   SPECGRAV 1.025   PROTEINUA 3+*   GLUCUA 2+*   KETONESU Negative   BILIRUBINUA Negative   OCCULTUA 1+*   NITRITE Negative   UROBILINOGEN Negative   LEUKOCYTESUR Negative   RBCUA 1   WBCUA 15*   BACTERIA Many*   SQUAMEPITHEL 1   HYALINECASTS Many*       Significant Imaging: I have reviewed and interpreted all pertinent imaging results/findings within the past 24 hours.

## 2019-01-13 NOTE — PLAN OF CARE
Plan of care reviewed with patient. Patient voiced understanding. NSR on monitor with occasional PVCs. No acute distress noted. Pt denies Nausea and vomiting and has had no episodes of vomiting since she arrived to the floor. Side rails x 2, bed in lowest position, call bell within reach, pt advised to call for assistance. Maintain bed alarm for patient safety.

## 2019-01-13 NOTE — PLAN OF CARE
Problem: Adult Inpatient Plan of Care  Goal: Plan of Care Review  Outcome: Ongoing (interventions implemented as appropriate)  Plan of care reviewed with patient. Patient voiced understanding. NSR on monitor with occasional PVCs. No acute distress noted. Pt denies Nausea and vomiting and has had no episodes of vomiting since she arrived to the floor. Side rails x 2, bed in lowest position, call bell within reach, pt advised to call for assistance. Maintain bed alarm for patient safety.

## 2019-01-13 NOTE — PROGRESS NOTES
"[facility] Hospital Medicine  Progress Note    Patient Name: Lilly Frye  MRN: [id number]  Patient Class: IP- Inpatient   Admission Date: 1/11/2019  Length of Stay: 2 days  Attending Physician: Rey Bailey MD  Primary Care Provider: Bud Carmen MD        Subjective:     Principal Problem:Chest pain    HPI:  Patient is a 55 y.o. AAF with pmhx of HFpEF, CAD (NSTEMI s/p stents placed x2 12/15, one MATTEO in 11/2018), HTN, HLD, DM2, Stroke, seizures who presents after referral from [facility] ER. Pt was noted to have chest pain and vague abdominal discomfort.  Cardiology managing chest pain and hypertensive urgency.  Hospital Medicine consulted for c/o abdominal pain.  Patient states pain started on Monday (1/7/19) while sleeping in bed, patient states she woke up out of sleep and had episode of nausea and vomiting followed by LLQ abdominal pain and "burning.  Patient denies similar past episodes, pain has been intermittent since then, and is now present in LLQ, "burning" and tenderness rated at 6/10, pain reproducible on examination.  Patient endorses pain improves with change in position.  Patient currently denies nausea and vomiting but has had several episodes since hospital admission.  Denies fever, chills, urinary discomfort, flank pain, early satiety, constipation, diarrhea.  Only physical examination patient was very lethargic, easily aroused, blood pressure low, cardiology notified by RN.  KUB, showed no obstruction, u/a and CT pending.              Hospital Course:  1/13/18  Patient still reports intermittent, burning pain, rated at 5/10 tor LLQ and mid abdominal area, KUB and CT of abdomen negative for abdominal findings, U/A positive, pending cultures, started po Cipro, continue Protonix, patient can follow up with pcp for further management.    Interval History: AAOx4, answers question appropriately, still complaining of "burning" abdominal pain, U/A positive for UTI, cipro " "started.  Review of Systems   Constitutional: Negative for appetite change, chills, fatigue and fever.   HENT: Negative for congestion, postnasal drip, rhinorrhea and sore throat.    Eyes: Negative for visual disturbance.   Respiratory: Negative for chest tightness and shortness of breath.    Cardiovascular: Negative for chest pain.   Gastrointestinal: Positive for abdominal pain ("burning" pain LLQ and mid abdominal area). Negative for abdominal distention.   Genitourinary: Negative for difficulty urinating and flank pain.   Musculoskeletal: Negative for back pain, gait problem and neck stiffness.   Skin: Negative for color change.   Neurological: Negative for weakness, light-headedness and headaches.   Hematological: Does not bruise/bleed easily.   Psychiatric/Behavioral: Negative for agitation and confusion.     Objective:     Vital Signs (Most Recent):  Temp: 98.9 °F (37.2 °C) (01/13/19 0814)  Pulse: 78 (01/13/19 0814)  Resp: 18 (01/13/19 0814)  BP: (!) 161/79 (01/13/19 0814)  SpO2: 95 % (01/13/19 0802) Vital Signs (24h Range):  Temp:  [96.6 °F (35.9 °C)-100.1 °F (37.8 °C)] 98.9 °F (37.2 °C)  Pulse:  [67-80] 78  Resp:  [16-18] 18  SpO2:  [95 %-96 %] 95 %  BP: ()/(54-79) 161/79     Weight: 82.3 kg (181 lb 7 oz)  Body mass index is 27.59 kg/m².    Intake/Output Summary (Last 24 hours) at 1/13/2019 1020  Last data filed at 1/13/2019 0800  Gross per 24 hour   Intake 250 ml   Output 600 ml   Net -350 ml      Physical Exam   Constitutional: She is oriented to person, place, and time. She appears well-developed and well-nourished.   HENT:   Head: Normocephalic.   Eyes: Pupils are equal, round, and reactive to light.   Neck: Normal range of motion.   Cardiovascular: Normal rate and regular rhythm.   Pulmonary/Chest: Effort normal and breath sounds normal.   Abdominal: Soft. Bowel sounds are normal. She exhibits no mass. There is no tenderness. There is no rebound.   Musculoskeletal: Normal range of motion. " "  Neurological: She is alert and oriented to person, place, and time.   Skin: Skin is warm and dry.   Psychiatric: She has a normal mood and affect.       Significant Labs:   CBC:   Recent Labs   Lab 01/12/19  0319 01/13/19  0520   WBC 4.15 4.10   HGB 10.4* 9.8*   HCT 33.3* 31.5*    268     CMP:   Recent Labs   Lab 01/11/19  1602 01/12/19  0319 01/13/19  0520    136 134*   K 3.6 3.7 3.6   CL 98 97 95   CO2 30* 27 33*   * 360* 243*   BUN 18 18 20   CREATININE 1.4 1.4 1.8*   CALCIUM 8.7 8.6* 8.5*   ANIONGAP 9 12 6*   EGFRNONAA 41* 41* 30*     Urine Studies:   Recent Labs   Lab 01/13/19  0753   COLORU Yellow   APPEARANCEUA Clear   PHUR 6.0   SPECGRAV 1.025   PROTEINUA 3+*   GLUCUA 2+*   KETONESU Negative   BILIRUBINUA Negative   OCCULTUA 1+*   NITRITE Negative   UROBILINOGEN Negative   LEUKOCYTESUR Negative   RBCUA 1   WBCUA 15*   BACTERIA Many*   SQUAMEPITHEL 1   HYALINECASTS Many*       Significant Imaging: I have reviewed and interpreted all pertinent imaging results/findings within the past 24 hours.    Assessment/Plan:      UTI (urinary tract infection)    Abdominal Pain   KUB and CT negative for acute abdominal findings  UA positive for UTI, start Cipro,pending culture results   Continue Protonix, patient describes pain as "burning", suspicious for gastritis  Patient can follow up with PCP for further management           VTE Risk Mitigation (From admission, onward)        Ordered     heparin (porcine) injection 5,000 Units  Every 8 hours      01/12/19 0721     IP VTE HIGH RISK PATIENT  Once      01/12/19 0721              Za Simon NP  Department of Hospital Medicine   [facility]  "

## 2019-01-13 NOTE — NURSING
Notified Dr. Bailey that patient hasn't voided since last night, bladder scan done around 0550;  >563 mL residual noted.  Pt requested to void after doing bladder scan, but till now she hasn't voided.  Also informed that pt was having high temp at night around 100, complaining discomfort on abdomen; tylenol given at 0608.  MD stated put the order for in/out cath and UA.

## 2019-01-14 VITALS
SYSTOLIC BLOOD PRESSURE: 117 MMHG | DIASTOLIC BLOOD PRESSURE: 63 MMHG | OXYGEN SATURATION: 95 % | HEART RATE: 68 BPM | TEMPERATURE: 97 F | BODY MASS INDEX: 27.5 KG/M2 | RESPIRATION RATE: 18 BRPM | HEIGHT: 68 IN | WEIGHT: 181.44 LBS

## 2019-01-14 LAB
ANION GAP SERPL CALC-SCNC: 6 MMOL/L
BACTERIA UR CULT: NO GROWTH
BASOPHILS # BLD AUTO: 0.01 K/UL
BASOPHILS NFR BLD: 0.3 %
BUN SERPL-MCNC: 21 MG/DL
CALCIUM SERPL-MCNC: 8.5 MG/DL
CHLORIDE SERPL-SCNC: 96 MMOL/L
CO2 SERPL-SCNC: 33 MMOL/L
CREAT SERPL-MCNC: 1.6 MG/DL
DIFFERENTIAL METHOD: ABNORMAL
EOSINOPHIL # BLD AUTO: 0.1 K/UL
EOSINOPHIL NFR BLD: 3.6 %
ERYTHROCYTE [DISTWIDTH] IN BLOOD BY AUTOMATED COUNT: 13.4 %
EST. GFR  (AFRICAN AMERICAN): 40 ML/MIN/1.73 M^2
EST. GFR  (NON AFRICAN AMERICAN): 35 ML/MIN/1.73 M^2
GLUCOSE SERPL-MCNC: 202 MG/DL
HCT VFR BLD AUTO: 31.7 %
HGB BLD-MCNC: 9.9 G/DL
LYMPHOCYTES # BLD AUTO: 2.1 K/UL
LYMPHOCYTES NFR BLD: 53.3 %
MCH RBC QN AUTO: 28 PG
MCHC RBC AUTO-ENTMCNC: 31.2 G/DL
MCV RBC AUTO: 90 FL
MONOCYTES # BLD AUTO: 0.5 K/UL
MONOCYTES NFR BLD: 12.1 %
NEUTROPHILS # BLD AUTO: 1.2 K/UL
NEUTROPHILS NFR BLD: 30.7 %
PLATELET # BLD AUTO: 245 K/UL
PMV BLD AUTO: 10.8 FL
POCT GLUCOSE: 159 MG/DL (ref 70–110)
POCT GLUCOSE: 178 MG/DL (ref 70–110)
POTASSIUM SERPL-SCNC: 3.4 MMOL/L
RBC # BLD AUTO: 3.54 M/UL
SODIUM SERPL-SCNC: 135 MMOL/L
WBC # BLD AUTO: 3.9 K/UL

## 2019-01-14 PROCEDURE — 99239 HOSP IP/OBS DSCHRG MGMT >30: CPT | Mod: ,,, | Performed by: NURSE PRACTITIONER

## 2019-01-14 PROCEDURE — 25000003 PHARM REV CODE 250: Performed by: NURSE PRACTITIONER

## 2019-01-14 PROCEDURE — 36415 COLL VENOUS BLD VENIPUNCTURE: CPT

## 2019-01-14 PROCEDURE — 25000003 PHARM REV CODE 250: Performed by: STUDENT IN AN ORGANIZED HEALTH CARE EDUCATION/TRAINING PROGRAM

## 2019-01-14 PROCEDURE — 80048 BASIC METABOLIC PNL TOTAL CA: CPT

## 2019-01-14 PROCEDURE — 93010 ELECTROCARDIOGRAM REPORT: CPT | Mod: ,,, | Performed by: INTERNAL MEDICINE

## 2019-01-14 PROCEDURE — 87086 URINE CULTURE/COLONY COUNT: CPT

## 2019-01-14 PROCEDURE — 93005 ELECTROCARDIOGRAM TRACING: CPT

## 2019-01-14 PROCEDURE — 93010 EKG 12-LEAD: ICD-10-PCS | Mod: ,,, | Performed by: INTERNAL MEDICINE

## 2019-01-14 PROCEDURE — 63600175 PHARM REV CODE 636 W HCPCS: Performed by: STUDENT IN AN ORGANIZED HEALTH CARE EDUCATION/TRAINING PROGRAM

## 2019-01-14 PROCEDURE — 87088 URINE BACTERIA CULTURE: CPT

## 2019-01-14 PROCEDURE — 99239 PR HOSPITAL DISCHARGE DAY,>30 MIN: ICD-10-PCS | Mod: ,,, | Performed by: NURSE PRACTITIONER

## 2019-01-14 PROCEDURE — 85025 COMPLETE CBC W/AUTO DIFF WBC: CPT

## 2019-01-14 RX ORDER — POTASSIUM CHLORIDE 20 MEQ/1
40 TABLET, EXTENDED RELEASE ORAL ONCE
Status: DISCONTINUED | OUTPATIENT
Start: 2019-01-14 | End: 2019-01-14

## 2019-01-14 RX ORDER — POTASSIUM CHLORIDE 20 MEQ/15ML
40 SOLUTION ORAL ONCE
Status: COMPLETED | OUTPATIENT
Start: 2019-01-14 | End: 2019-01-14

## 2019-01-14 RX ADMIN — INSULIN ASPART 2 UNITS: 100 INJECTION, SOLUTION INTRAVENOUS; SUBCUTANEOUS at 01:01

## 2019-01-14 RX ADMIN — ISOSORBIDE MONONITRATE 30 MG: 30 TABLET, EXTENDED RELEASE ORAL at 09:01

## 2019-01-14 RX ADMIN — PANTOPRAZOLE SODIUM 40 MG: 40 TABLET, DELAYED RELEASE ORAL at 09:01

## 2019-01-14 RX ADMIN — ATORVASTATIN CALCIUM 80 MG: 40 TABLET, FILM COATED ORAL at 09:01

## 2019-01-14 RX ADMIN — CLOPIDOGREL BISULFATE 75 MG: 75 TABLET ORAL at 09:01

## 2019-01-14 RX ADMIN — INSULIN ASPART 2 UNITS: 100 INJECTION, SOLUTION INTRAVENOUS; SUBCUTANEOUS at 09:01

## 2019-01-14 RX ADMIN — POTASSIUM CHLORIDE 40 MEQ: 20 SOLUTION ORAL at 09:01

## 2019-01-14 RX ADMIN — AMLODIPINE BESYLATE 5 MG: 5 TABLET ORAL at 09:01

## 2019-01-14 RX ADMIN — LEVETIRACETAM 1000 MG: 500 TABLET ORAL at 09:01

## 2019-01-14 RX ADMIN — ASPIRIN 81 MG: 81 TABLET, COATED ORAL at 09:01

## 2019-01-14 RX ADMIN — HEPARIN SODIUM 5000 UNITS: 5000 INJECTION, SOLUTION INTRAVENOUS; SUBCUTANEOUS at 05:01

## 2019-01-14 RX ADMIN — CARVEDILOL 6.25 MG: 6.25 TABLET, FILM COATED ORAL at 09:01

## 2019-01-14 RX ADMIN — FLUOXETINE HYDROCHLORIDE 20 MG: 20 CAPSULE ORAL at 09:01

## 2019-01-14 RX ADMIN — CIPROFLOXACIN HYDROCHLORIDE 250 MG: 250 TABLET, FILM COATED ORAL at 09:01

## 2019-01-14 NOTE — DISCHARGE SUMMARY
[facility]  Cardiology  Discharge Summary      Patient Name: Lilly Frye  MRN: [id number]  Admission Date: 1/11/2019  Hospital Length of Stay: 3 days  Discharge Date and Time: No discharge date for patient encounter.  Attending Physician: Rey Bailey MD    Discharging Provider: Phu Zamora NP  Primary Care Physician: Bud Carmen MD    HPI:   59yo female with history of CAD s/p LAD/LCX PCI 2015 s/p LAD MATTEO 11/2018, HTN, HLP, DMII, CKD stage III, CVA, HFpEF and MR who was transferred from [facility] with elevated troponin and HTN urgency. She complains of chest pain for the past 3-4 days with minimal exertion. The pain would relieve with rest but would recur with resumed activity. She reports being awoken at 2am this AM with chest pain worse than previous episodes. She described the pain as a midsternal chest tightness that was associated with SOB, nausea and vomiting but was not associated with radiation or diaphoresis. Her symptoms are not similar to her previous symptoms prior to her PCI and at that time was more SOB. She reports compliance with her medication regimen however her family member at the bedside contradicates this and reports missed medications for unknown reasons     * No surgery found *     Indwelling Lines/Drains at time of discharge:  Lines/Drains/Airways          None          Hospital Course:  1/11/2019 Presented to the ER at [facility] with complaints of chest pain. Initial troponin .247 with repeat troponin .226. EKG NSR with PVCs T wave abnormality to lateral leads similar to previous EKG. BMP with K+ 4.5 BUN 19 creatinine 1.6. BP elevated at 212/128 upon admission. Started on IV Heparin drip as well IV Tridil drip with trend down of BP to 160s/70s. Transferred to [facility] for further evaluation. Admitted to [facility] with BP elevated at 188/112 and recurrent chest pain with no Tridil infusing. STAT EKG and labs pending. Resumed IV Tridil drip    1/12/19:  Chest pain is resolved. Continues to c/o left sided abdominal pain. Unable to tolerate most of her dinner. Norco help a little with her pain. No BM since hospital admit.  Trop and ecg stable.     1/13/19: Pt is sleepy this am. Trop is now down trending, HTN is better controlled. Pt Rn staff, pt is not as alert as previous day. >500cc of urine in am bladder. Agree for in/out stevenson and to collect UA.  Tentative to discharge home later today...     ** pt is now see dead people per RN staff. Give cipro for UTI. Unknown reason y pt is delirious.**     01/14/2019 Patient hemodynamically stable. Mental status improved, appropriate for discharge.     Consults:   Consults (From admission, onward)        Status Ordering Provider     Inpatient consult to [facility]  Once     Provider:  (Not yet assigned)    Completed DEDRICK LOPEZ     IP consult to case management  Once     Provider:  (Not yet assigned)    Acknowledged JANESSA STEELE          Significant Diagnostic Studies: Labs:   BMP:   Recent Labs   Lab 01/13/19  0520 01/14/19  0453   * 202*   * 135*   K 3.6 3.4*   CL 95 96   CO2 33* 33*   BUN 20 21*   CREATININE 1.8* 1.6*   CALCIUM 8.5* 8.5*   , CMP   Recent Labs   Lab 01/13/19  0520 01/14/19  0453   * 135*   K 3.6 3.4*   CL 95 96   CO2 33* 33*   * 202*   BUN 20 21*   CREATININE 1.8* 1.6*   CALCIUM 8.5* 8.5*   ANIONGAP 6* 6*   ESTGFRAFRICA 35* 40*   EGFRNONAA 30* 35*   , CBC   Recent Labs   Lab 01/13/19  0520 01/14/19  0453   WBC 4.10 3.90   HGB 9.8* 9.9*   HCT 31.5* 31.7*    245   , INR   Lab Results   Component Value Date    INR 1.0 11/06/2018    INR 0.9 10/24/2018    INR 0.9 08/09/2018   , Lipid Panel   Lab Results   Component Value Date    CHOL 191 01/12/2019    HDL 59 01/12/2019    LDLCALC 112.8 01/12/2019    TRIG 96 01/12/2019    CHOLHDL 30.9 01/12/2019   , Troponin   Recent Labs   Lab 01/13/19  0520   TROPONINI 0.148*   , A1C:   Recent Labs   Lab  10/24/18  1046 11/20/18  0859 01/12/19  0319   HGBA1C 13.2* 11.7* 10.5*    and All labs within the past 24 hours have been reviewed  Cardiac Graphics: Echocardiogram:   2D echo with color flow doppler:   Results for orders placed or performed during the hospital encounter of 06/29/18   2D Echo w/ Color Flow Doppler   Result Value Ref Range    QEF 55 55 - 65    Diastolic Dysfunction Yes (A)     Est. PA Systolic Pressure 7.16     and Transthoracic echo (TTE) complete (Cupid Only):   Results for orders placed or performed during the hospital encounter of 01/11/19   Transthoracic echo (TTE) complete (Cupid Only)   Result Value Ref Range    BSA 1.96 m2    LA WIDTH 4.41 cm    AORTIC VALVE CUSP SEPERATION 1.91 cm    PV PEAK VELOCITY 0.88 cm/s    LVIDD 4.89 3.5 - 6.0 cm    IVS 0.87 0.6 - 1.1 cm    PW 0.94 0.6 - 1.1 cm    Ao root annulus 2.92 cm    LVIDS 3.41 2.1 - 4.0 cm    FS 30 28 - 44 %    LV mass 153.55 g    LA size 4.55 cm    RVDD 2.84 cm    Left Ventricle Relative Wall Thickness 0.38 cm    AV mean gradient 7.06 mmHg    AV valve area 2.28 cm2    AV index (prosthetic) 0.88     E/A ratio 1.21     E wave decelartion time 267.52 msec    IVRT 0.06 msec    Pulm vein S/D ratio 1.02     LVOT diameter 1.82 cm    LVOT area 2.60 cm2    LVOT peak VTI 21.24 cm    Ao peak jesus 1.78 m/s    Ao VTI 24.27 cm    LVOT stroke volume 55.23 cm3    AV peak gradient 12.67 mmHg    MV Peak E Jesus 1.41 m/s    MV Peak A Jesus 1.17 m/s    PV Peak S Jesus 0.59 m/s    PV Peak D Jesus 0.58 m/s    LV Systolic Volume 47.80 mL    LV Systolic Volume Index 24.4 mL/m2    LV Diastolic Volume 112.48 mL    LV Diastolic Volume Index 57.36 mL/m2    LV Mass Index 78.3 g/m2    RA Major Axis 5.29 cm    Left Atrium Major Axis 5.29 cm    Right Atrial Pressure (from IVC) 8 mmHg       Pending Diagnostic Studies:     Procedure Component Value Units Date/Time    EKG 12-lead [[id number]]     Order Status:  Sent Lab Status:  No result     EKG 12-lead [[id number]]     Order Status:   Sent Lab Status:  No result           Final Active Diagnoses:    Diagnosis Date Noted POA    PRINCIPAL PROBLEM:  Chest pain [R07.9] 01/11/2019 Yes    UTI (urinary tract infection) [N39.0] 01/13/2019 Yes    (HFpEF) heart failure with preserved ejection fraction [I50.30] 12/06/2018 Yes    CKD (chronic kidney disease) stage 3, GFR 30-59 ml/min [N18.3] 12/06/2018 Yes     Chronic    Hyperlipidemia [E78.5] 01/08/2016 Yes    Hypertension [I10] 08/27/2014 Yes     Chronic    Type 2 diabetes mellitus with complication, with long-term current use of insulin [E11.8, Z79.4] 08/27/2014 Not Applicable     Chronic      Problems Resolved During this Admission:    Diagnosis Date Noted Date Resolved POA    Abdominal pain [R10.9] 01/12/2019 01/13/2019 Yes    Hypertensive urgency [I16.0] 08/09/2018 01/13/2019 Unknown    Coronary artery disease due to lipid rich plaque.  MATTEO LAD/LCx 12/2015 [I25.10, I25.83] 01/08/2016 01/13/2019 Yes    NSTEMI (non-ST elevated myocardial infarction) [I21.4] 12/05/2015 01/13/2019 Yes     No new Assessment & Plan notes have been filed under this hospital service since the last note was generated.  Service: Cardiology      Discharged Condition: good    Disposition: Home or Self Care    Follow Up:  Follow-up Information     Rey Bailey MD. Schedule an appointment as soon as possible for a visit in 2 weeks.    Specialties:  INTERVENTIONAL CARDIOLOGY, Cardiology  Why:  Offices closed for weekend. Patient to schedule own follow up appointment.  Contact information:  [address]  [address]  [facility] [address] [phone]                 Patient Instructions:      Diet Cardiac     Diet Cardiac     Diet diabetic     Activity as tolerated     Activity as tolerated     Medications:  Reconciled Home Medications:      Medication List      START taking these medications    amLODIPine 5 MG tablet  Commonly known as:  NORVASC  Take 1 tablet (5 mg total) by mouth once daily.     ciprofloxacin HCl  250 MG tablet  Commonly known as:  CIPRO  Take 1 tablet (250 mg total) by mouth every 12 (twelve) hours.     isosorbide mononitrate 30 MG 24 hr tablet  Commonly known as:  IMDUR  Take 1 tablet (30 mg total) by mouth once daily.     pantoprazole 40 MG tablet  Commonly known as:  PROTONIX  Take 1 tablet (40 mg total) by mouth once daily.        CHANGE how you take these medications    carvedilol 12.5 MG tablet  Commonly known as:  COREG  Take 1 tablet (12.5 mg total) by mouth 2 (two) times daily with meals.  What changed:    · medication strength  · how much to take     * clopidogrel 75 mg tablet  Commonly known as:  PLAVIX  Take 1 tablet (75 mg total) by mouth once daily.  What changed:  Another medication with the same name was added. Make sure you understand how and when to take each.     * clopidogrel 75 mg tablet  Commonly known as:  PLAVIX  Take 1 tablet (75 mg total) by mouth once daily.  What changed:  You were already taking a medication with the same name, and this prescription was added. Make sure you understand how and when to take each.         * This list has 2 medication(s) that are the same as other medications prescribed for you. Read the directions carefully, and ask your doctor or other care provider to review them with you.            CONTINUE taking these medications    acetaminophen 325 MG tablet  Commonly known as:  TYLENOL  Take 2 tablets (650 mg total) by mouth every 6 (six) hours as needed for Pain.     ammonium lactate 12 % Crea  Apply to the body at least twice daily     aspirin 81 MG EC tablet  Commonly known as:  ECOTRIN  Take 1 tablet (81 mg total) by mouth once daily.     atorvastatin 80 MG tablet  Commonly known as:  LIPITOR  TAKE ONE TABLET BY MOUTH ONCE DAILY     blood sugar diagnostic Strp  1 strip by Misc.(Non-Drug; Combo Route) route 3 (three) times daily.     blood-glucose meter kit  Commonly known as:  RELION ALL-IN-ONE METER  Use as instructed     brimonidine 0.2% 0.2 %  Drop  Commonly known as:  ALPHAGAN  Place 1 drop into the right eye 3 (three) times daily.     cetirizine 10 MG tablet  Commonly known as:  ZyrTEC  Take 1 tablet (10 mg total) by mouth every morning.     dorzolamide-timolol 2-0.5% 22.3-6.8 mg/mL ophthalmic solution  Commonly known as:  COSOPT  Place 1 drop into the right eye 2 (two) times daily.     fish oil-omega-3 fatty acids 300-1,000 mg capsule  Take 2 capsules (2 g total) by mouth once daily.     FLUoxetine 20 MG capsule  Take 1 capsule (20 mg total) by mouth once daily.     furosemide 40 MG tablet  Commonly known as:  LASIX  Take 1 tablet (40 mg total) by mouth 2 (two) times daily.     gabapentin 300 MG capsule  Commonly known as:  NEURONTIN  Take 1 capsule (300 mg total) by mouth 3 (three) times daily.     hydrOXYzine HCl 25 MG tablet  Commonly known as:  ATARAX     insulin aspart U-100 100 unit/mL Inpn pen  Commonly known as:  NovoLOG  Inject 14 Units into the skin 3 (three) times daily with meals.     lancets Misc  Commonly known as:  ONETOUCH ULTRASOFT LANCETS  1 application by Misc.(Non-Drug; Combo Route) route 3 (three) times daily.     latanoprost 0.005 % ophthalmic solution  Commonly known as:  XALATAN  Place 1 drop into the right eye once daily.     LEVEMIR FLEXTOUCH U-100 INSULN 100 unit/mL (3 mL) Inpn pen  Generic drug:  insulin detemir U-100  Inject 35 Units into the skin every evening.     levETIRAcetam 500 MG Tab  Commonly known as:  KEPPRA  Take 2 tablets (1,000 mg total) by mouth 2 (two) times daily. 2 tablets twice per day     metFORMIN 500 MG tablet  Commonly known as:  GLUCOPHAGE  Take 1 tablet (500 mg total) by mouth 2 (two) times daily with meals. 1/2 tablet BID if you develop loose stools that don't improve in 1week     nitroGLYCERIN 0.4 MG SL tablet  Commonly known as:  NITROSTAT  Place 1 tablet (0.4 mg total) under the tongue every 5 (five) minutes as needed for Chest pain.     nystatin powder  Commonly known as:  MYCOSTATIN  Apply  "topically 2 (two) times daily.     pen needle, diabetic 32 gauge x 5/32" Ndle  1 each by Misc.(Non-Drug; Combo Route) route 2 (two) times daily with meals.     triamcinolone acetonide 0.1% 0.1 % cream  Commonly known as:  KENALOG  Apply topically 2 (two) times daily. Thin amount to the skin on the arms and legs        STOP taking these medications    betamethasone dipropionate 0.05 % cream  Commonly known as:  DIPROLENE     losartan 50 MG tablet  Commonly known as:  COZAAR     polyvinyl alcohol (artificial tears) 1.4 % ophthalmic solution  Commonly known as:  LIQUIFILM TEARS            Time spent on the discharge of patient: 45 minutes    Phu Zamora NP  Cardiology  [facility]  "

## 2019-01-14 NOTE — PLAN OF CARE
Plan for d/c home today with daughter who will be by after 12 to pick pt up.  Follow-up appts scheduled, in AVS.  Pt verbalized understanding of need to take medications at d/c to maintain health.  Nurse will review all medications at d/c.  Pt denies any additional needs at this time.       01/14/19 0956   Final Note   Assessment Type Final Discharge Note   Anticipated Discharge Disposition Home   What phone number can be called within the next 1-3 days to see how you are doing after discharge? ([phone])   Hospital Follow Up  Appt(s) scheduled? Yes   Discharge plans and expectations educations in teach back method with documentation complete? Yes   Right Care Referral Info   Post Acute Recommendation No Care     Future Appointments   Date Time Provider Department Center   1/14/2019  2:00 PM OPHTHALMOLOGY TESTING, [facility] OPHTHAL ALEX GREEN   1/17/2019  8:00 AM OPHTHALMOLOGY TESTING, [facility] OPHTHAL ALEX GREEN   1/17/2019  9:00 AM OPHTHALMOLOGY GLAUCOMA, [facility] OPHTHAL ALEX GREEN   1/22/2019  8:30 AM [facility] [facility]   1/24/2019 10:00 AM UROLOGY CLINIC [facility] UROLOGY ALEX GOLD   1/24/2019  1:30 PM Jaylan Rivera MD [facility] RHEUM ALEX ACT   1/28/2019  9:00 AM Rey Bailey MD [facility] CARDIO [place] [facility]   5/21/2019  9:00 AM [facility] Lamas [facility] [place] [facility]   6/6/2019 10:00 AM Kenyon Lopez MD [facility] CARDIO ALEX [facility]

## 2019-01-14 NOTE — PROGRESS NOTES
.Pharmacy New Medication Education    Patient accepted medication education.    Pharmacy educated patient on name and purpose of medications and possible side effects, using the teach-back method.     Current Inpatient Medication Orders   (pyxis) gi cocktail (mylanta 30 mL, lidocaine 2 % viscous 10 mL, dicyclomine 10 mL) 50 mL   acetaminophen tablet 650 mg   aluminum-magnesium hydroxide-simethicone 200-200-20 mg/5 mL suspension 30 mL   amLODIPine tablet 5 mg   aspirin EC tablet 81 mg   atorvastatin tablet 80 mg   carvedilol tablet 6.25 mg   ciprofloxacin HCl tablet 250 mg   clopidogrel tablet 75 mg   dextrose 50% injection 12.5 g   dextrose 50% injection 25 g   FLUoxetine capsule 20 mg   glucagon (human recombinant) injection 1 mg   glucose chewable tablet 16 g   glucose chewable tablet 24 g   heparin (porcine) injection 5,000 Units   hydrALAZINE injection 10 mg   HYDROcodone-acetaminophen 5-325 mg per tablet 1 tablet   hydrOXYzine HCl tablet 25 mg   insulin aspart U-100 pen 1-10 Units   insulin detemir U-100 pen 20 Units   isosorbide mononitrate 24 hr tablet 30 mg   levETIRAcetam tablet 1,000 mg   nitroGLYCERIN SL tablet 0.4 mg   ondansetron disintegrating tablet 8 mg   pantoprazole EC tablet 40 mg   polyethylene glycol packet 17 g   potassium chloride 10% oral solution 40 mEq   ramelteon tablet 8 mg   senna-docusate 8.6-50 mg per tablet 1 tablet       Learners of pharmacy medication education included:  Patient    Patient +/- learner response:  Verbalized Understanding, Teachback

## 2019-01-14 NOTE — NURSING
Discharge instructions and education provided. Patient verbal states of understanding. IV sites removed with intact catheters and telemetry discontinued without adverse reaction. Patient discharged with her belongings and no acute distress noted.

## 2019-01-15 LAB — BACTERIA UR CULT: NORMAL

## 2019-01-16 ENCOUNTER — PATIENT OUTREACH (OUTPATIENT)
Dept: ADMINISTRATIVE | Facility: CLINIC | Age: 59
End: 2019-01-16

## 2019-01-16 NOTE — PROGRESS NOTES
C3 nurse attempted to contact patient. No answer. The following message was left for the patient to return the call:  Good afternoon,  I am a nurse calling on behalf of [facility] from the Care Coordination Center.  This is a Transitional Care Call for Lilly Frye. When you have a moment please contact us at [phone] or [phone] Monday through Friday, between the hours of 8 am to 4 pm. We look forward to speaking with you. On behalf of [facility] have a nice day.    The patient has a scheduled HOSFU appointment with JAKE Greer on 1\22 @ 0830. Message sent to Physician staff.

## 2019-01-27 PROBLEM — I25.10 CORONARY ARTERY DISEASE: Status: ACTIVE | Noted: 2019-01-27

## 2019-01-27 PROBLEM — I50.9 CHF EXACERBATION: Status: ACTIVE | Noted: 2019-01-27

## 2019-01-28 PROBLEM — I50.9 ACUTE ON CHRONIC CONGESTIVE HEART FAILURE: Status: ACTIVE | Noted: 2019-01-28

## 2019-01-29 PROBLEM — I50.9 ACUTE ON CHRONIC CONGESTIVE HEART FAILURE: Status: RESOLVED | Noted: 2019-01-28 | Resolved: 2019-01-29

## 2019-01-30 PROBLEM — Z78.9 KNOWLEDGE DEFICIT ABOUT THERAPEUTIC DIET: Status: ACTIVE | Noted: 2019-01-30

## 2019-02-01 ENCOUNTER — PATIENT OUTREACH (OUTPATIENT)
Dept: ADMINISTRATIVE | Facility: CLINIC | Age: 59
End: 2019-02-01

## 2019-02-01 NOTE — PATIENT INSTRUCTIONS

## 2019-02-01 NOTE — PROGRESS NOTES
[phone] [facility]  [phone] [facility]  [phone] No available  C3 nurse attempted to contact patient. No answer. The following message was left for the patient to return the call:  Good morning  I am a nurse calling on behalf of [facility] from the Care Coordination Center.  This is a Transitional Care Call for Lilly Frye. When you have a moment please contact us at [phone] or [phone] Monday through Friday, between the hours of 8 am to 4 pm. We look forward to speaking with you. On behalf of [facility] have a nice day.    The patient has a scheduled HOSFU appointment with Dr Bailey  on 02/08/19 @ 1000 hrs.

## 2019-02-01 NOTE — PROGRESS NOTES
[facility] Script completed with patient. Reports does not have eye drops or Glucometer, strips, lancets. [facility] called spoke with Brandy, verified no order at pharmacy for glucometer and accessories. Message to Dr Rand. Notified patient that there are refills on her 2 eye drops and message sent to Dr Rand about glucometer, verbalizes underrstanding,

## 2019-02-08 ENCOUNTER — OFFICE VISIT (OUTPATIENT)
Dept: CARDIOLOGY | Facility: CLINIC | Age: 59
End: 2019-02-08
Payer: MEDICARE

## 2019-02-08 VITALS
HEIGHT: 68 IN | OXYGEN SATURATION: 95 % | BODY MASS INDEX: 27.99 KG/M2 | SYSTOLIC BLOOD PRESSURE: 156 MMHG | HEART RATE: 92 BPM | DIASTOLIC BLOOD PRESSURE: 80 MMHG | WEIGHT: 184.69 LBS

## 2019-02-08 DIAGNOSIS — E78.2 MIXED HYPERLIPIDEMIA: ICD-10-CM

## 2019-02-08 DIAGNOSIS — I10 ESSENTIAL HYPERTENSION: Chronic | ICD-10-CM

## 2019-02-08 DIAGNOSIS — I25.10 CORONARY ARTERY DISEASE, ANGINA PRESENCE UNSPECIFIED, UNSPECIFIED VESSEL OR LESION TYPE, UNSPECIFIED WHETHER NATIVE OR TRANSPLANTED HEART: ICD-10-CM

## 2019-02-08 DIAGNOSIS — N18.30 CKD (CHRONIC KIDNEY DISEASE) STAGE 3, GFR 30-59 ML/MIN: Chronic | ICD-10-CM

## 2019-02-08 DIAGNOSIS — I50.30 (HFPEF) HEART FAILURE WITH PRESERVED EJECTION FRACTION: ICD-10-CM

## 2019-02-08 DIAGNOSIS — R60.0 PEDAL EDEMA: Primary | ICD-10-CM

## 2019-02-08 DIAGNOSIS — I25.2 HX OF NON-ST ELEVATION MYOCARDIAL INFARCTION (NSTEMI): ICD-10-CM

## 2019-02-08 PROCEDURE — 99213 OFFICE O/P EST LOW 20 MIN: CPT | Mod: PBBFAC,PO | Performed by: STUDENT IN AN ORGANIZED HEALTH CARE EDUCATION/TRAINING PROGRAM

## 2019-02-08 PROCEDURE — 99999 PR PBB SHADOW E&M-EST. PATIENT-LVL III: CPT | Mod: PBBFAC,,, | Performed by: STUDENT IN AN ORGANIZED HEALTH CARE EDUCATION/TRAINING PROGRAM

## 2019-02-08 PROCEDURE — 99999 PR PBB SHADOW E&M-EST. PATIENT-LVL III: ICD-10-PCS | Mod: PBBFAC,,, | Performed by: STUDENT IN AN ORGANIZED HEALTH CARE EDUCATION/TRAINING PROGRAM

## 2019-02-08 PROCEDURE — 99214 OFFICE O/P EST MOD 30 MIN: CPT | Mod: S$PBB,,, | Performed by: STUDENT IN AN ORGANIZED HEALTH CARE EDUCATION/TRAINING PROGRAM

## 2019-02-08 PROCEDURE — 99214 PR OFFICE/OUTPT VISIT, EST, LEVL IV, 30-39 MIN: ICD-10-PCS | Mod: S$PBB,,, | Performed by: STUDENT IN AN ORGANIZED HEALTH CARE EDUCATION/TRAINING PROGRAM

## 2019-02-08 RX ORDER — LOSARTAN POTASSIUM 25 MG/1
25 TABLET ORAL DAILY
Qty: 90 TABLET | Refills: 3 | Status: SHIPPED | OUTPATIENT
Start: 2019-02-08 | End: 2019-01-01 | Stop reason: SDUPTHER

## 2019-02-08 RX ORDER — CARVEDILOL 25 MG/1
25 TABLET ORAL 2 TIMES DAILY WITH MEALS
Qty: 180 TABLET | Refills: 3 | Status: SHIPPED | OUTPATIENT
Start: 2019-02-08 | End: 2019-01-01 | Stop reason: SDUPTHER

## 2019-02-08 RX ORDER — INSULIN PUMP SYRINGE, 3 ML
EACH MISCELLANEOUS
Qty: 1 EACH | Refills: 2 | Status: SHIPPED | OUTPATIENT
Start: 2019-02-08 | End: 2019-02-12

## 2019-02-08 NOTE — PROGRESS NOTES
"   Cardiology Clinic note    Subjective:   Patient ID:  Lilly Frye is a 58 y.o. female who presents for evaluation of HTN urgency, NSTEMI and heart failure    HPI:   Lilly Frye  has a past medical history of MAGO (acute kidney injury) (10/26/2018), Anemia of chronic disease (11/7/2018), Arthritis, Cataract, Diabetes mellitus, type 2, Foul smelling urine (12/5/2017), GERD (gastroesophageal reflux disease), Heart attack, Hypertension, Hypertensive emergency (10/24/2018), Hypertensive urgency (8/9/2018), Joint pain, NSTEMI (non-ST elevated myocardial infarction) (12/5/2015), S/P coronary artery stent placement, Seizures, and Stroke (2015).    Ms. Frye is a 54 yo F with pmhx of HFpEF, CAD (NSTEMI s/p stents placed x2 12/15, one MATTEO in 11/2018), HTN, HLD, DM2, Stroke, seizures who presents after referral from [facility] ER. Pt was noted to have chest pain and vague abdominal discomfort. (abd discomfort is acute on chronic). Pt was noted to hav SBP ~ 200 and trop elevation of 0.2. Home SBP was restarted, pt was also noted to have a UTI.    2/8/19: This is her first hospital follow up since being discharged. She notes being compliant on all home medications. NO ADR. She notes her pedal edema is about the same since discharge home. Her abdominal pain is improved. She is accompanied by her daughter as well. Notes SBP have been mildly elevated at home, ~150's      Vitals  Vitals:    02/08/19 0912   BP: (!) 156/80   Pulse: 92   SpO2: 95%   Weight: 83.8 kg (184 lb 11.2 oz)   Height: 5' 8" (1.727 m)       Patient Active Problem List    Diagnosis Date Noted    Knowledge deficit about therapeutic diet 01/30/2019    Coronary artery disease 01/27/2019    CHF exacerbation 01/27/2019    Confusion     UTI (urinary tract infection) 01/13/2019    Chest pain 01/11/2019    Left atrial enlargement 12/06/2018     Moderate   10/2018      (HFpEF) heart failure with preserved ejection fraction 12/06/2018     NYHA II " SOB around the house  EF 50-55% (10/2018)   EF 55%(10/15)          CKD (chronic kidney disease) stage 3, GFR 30-59 ml/min 12/06/2018     GFR 40 11/2018      History of noncompliance with medical treatment 12/06/2018    Acute on chronic respiratory failure with hypoxia 11/12/2018    Elevated troponin 11/12/2018    Anemia of chronic disease 11/07/2018    Hematuria 10/25/2018    Urinary retention 10/24/2018    Rheumatoid arthritis of hand 08/22/2018    Rash 02/08/2018    Itching 02/08/2018    Constipation 12/05/2017    Combined forms of age-related cataract of left eye 09/11/2017     Added automatically from request for surgery [id number]      Diabetic macular edema 07/14/2017    Cataract 02/14/2017    Hx of non-ST elevation myocardial infarction (NSTEMI) 01/09/2017    Numbness of lower limb 10/28/2016    History of CVA (cerebrovascular accident) 10/28/2016    Posterior subcapsular cataract, left 03/16/2016    Nuclear sclerotic cataract of left eye 03/16/2016    Mature cataract 03/16/2016    Moderate nonproliferative diabetic retinopathy of both eyes with macular edema associated with type 2 diabetes mellitus 03/16/2016    S/P coronary artery stent placement 01/08/2016     s/p stents to LAD & LCx 12/8/15.  Not on Plavix?? (insurance issues?).  Patient needs anti-platelet therapy      Hyperlipidemia 01/08/2016     LDL = 146 12/15      Mitral valve regurgitation 01/08/2016     Moderate as per 2D echo 10/15      Gait difficulty 09/30/2015    Right leg weakness 09/30/2015    Right leg pain 09/30/2015    Impaired functional mobility, balance, gait, and endurance 09/30/2015    NPDR (nonproliferative diabetic retinopathy) 01/08/2015    Type 2 diabetes mellitus with hyperglycemia, without long-term current use of insulin 08/27/2014    Hypertension 08/27/2014    Seizure 08/27/2014       Patient's Medications   New Prescriptions    BLOOD-GLUCOSE METER KIT    Use as instructed   Previous Medications     ACETAMINOPHEN (TYLENOL) 325 MG TABLET    Take 2 tablets (650 mg total) by mouth every 6 (six) hours as needed for Pain.    AMLODIPINE (NORVASC) 5 MG TABLET    Take 1 tablet (5 mg total) by mouth once daily.    AMMONIUM LACTATE 12 % CREA    Apply to the body at least twice daily    ASPIRIN (ECOTRIN) 81 MG EC TABLET    Take 1 tablet (81 mg total) by mouth once daily.    ATORVASTATIN (LIPITOR) 80 MG TABLET    TAKE ONE TABLET BY MOUTH ONCE DAILY    ATORVASTATIN (LIPITOR) 80 MG TABLET    Take 1 tablet (80 mg total) by mouth once daily.    BLOOD SUGAR DIAGNOSTIC STRP    1 strip by Misc.(Non-Drug; Combo Route) route 2 (two) times daily with meals. Accu Chek Test Strips, Diagnosis: E11.9    BLOOD-GLUCOSE METER (RELION ALL-IN-ONE METER) KIT    Use as instructed    BLOOD-GLUCOSE METER KIT    accucheck dx code E11.9    BRIMONIDINE 0.2% (ALPHAGAN) 0.2 % DROP    Place 1 drop into the right eye 3 (three) times daily.    BUMETANIDE (BUMEX) 2 MG TABLET    Take 1 tablet (2 mg total) by mouth 2 (two) times daily.    CETIRIZINE (ZYRTEC) 10 MG TABLET    Take 1 tablet (10 mg total) by mouth every morning.    CLOPIDOGREL (PLAVIX) 75 MG TABLET    Take 1 tablet (75 mg total) by mouth once daily.    DORZOLAMIDE-TIMOLOL 2-0.5% (COSOPT) 22.3-6.8 MG/ML OPHTHALMIC SOLUTION    Place 1 drop into the right eye 2 (two) times daily.    ERGOCALCIFEROL (ERGOCALCIFEROL) 50,000 UNIT CAP    Take 1 capsule (50,000 Units total) by mouth every 7 days. for 12 doses    FISH OIL-OMEGA-3 FATTY ACIDS 300-1,000 MG CAPSULE    Take 2 capsules (2 g total) by mouth once daily.    FLUOXETINE 20 MG CAPSULE    Take 1 capsule (20 mg total) by mouth once daily.    GABAPENTIN (NEURONTIN) 100 MG CAPSULE    Take 3 capsules (300 mg total) by mouth 3 (three) times daily.    HYDROXYZINE HCL (ATARAX) 25 MG TABLET        INSULIN ASPART U-100 (NOVOLOG) 100 UNIT/ML INPN PEN    Inject 14 Units into the skin 3 (three) times daily.    INSULIN DETEMIR U-100 (LEVEMIR FLEXTOUCH) 100  "UNIT/ML (3 ML) SUBQ INPN PEN    Inject 35 Units into the skin every evening.    ISOSORBIDE MONONITRATE (IMDUR) 30 MG 24 HR TABLET    Take 1 tablet (30 mg total) by mouth once daily.    LANCETS MISC    1 lancet by Misc.(Non-Drug; Combo Route) route 2 (two) times daily with meals.    LATANOPROST (XALATAN) 0.005 % OPHTHALMIC SOLUTION    Place 1 drop into the right eye once daily.    LEVEMIR FLEXTOUCH U-100 INSULN 100 UNIT/ML (3 ML) INPN PEN    Inject 35 Units into the skin every evening.    LEVETIRACETAM (KEPPRA) 1000 MG TABLET    Take 1 tablet (1,000 mg total) by mouth 2 (two) times daily.    METFORMIN (GLUCOPHAGE) 500 MG TABLET    Take 1 tablet (500 mg total) by mouth 2 (two) times daily with meals. 1/2 tablet BID if you develop loose stools that don't improve in 1week    NITROGLYCERIN (NITROSTAT) 0.4 MG SL TABLET    Place 1 tablet (0.4 mg total) under the tongue every 5 (five) minutes as needed for Chest pain.    NYSTATIN (MYCOSTATIN) POWDER    Apply topically 2 (two) times daily.    PANTOPRAZOLE (PROTONIX) 40 MG TABLET    Take 1 tablet (40 mg total) by mouth once daily.    PEN NEEDLE, DIABETIC 31 GAUGE X 5/16" NDLE    1 each by Misc.(Non-Drug; Combo Route) route 2 (two) times daily with meals.    PEN NEEDLE, DIABETIC 32 GAUGE X 5/32" NDLE    1 each by Misc.(Non-Drug; Combo Route) route 2 (two) times daily with meals.    TRIAMCINOLONE ACETONIDE 0.1% (KENALOG) 0.1 % CREAM    Apply topically 2 (two) times daily. Thin amount to the skin on the arms and legs   Modified Medications    Modified Medication Previous Medication    CARVEDILOL (COREG) 25 MG TABLET carvedilol (COREG) 12.5 MG tablet       Take 1 tablet (25 mg total) by mouth 2 (two) times daily with meals.    Take 1 tablet (12.5 mg total) by mouth 2 (two) times daily with meals.    LOSARTAN (COZAAR) 25 MG TABLET losartan (COZAAR) 25 MG tablet       Take 1 tablet (25 mg total) by mouth once daily.    Take 0.5 tablets (12.5 mg total) by mouth once daily. "   Discontinued Medications    No medications on file         Review of Systems   Constitution: Positive for weight loss. Negative for chills, decreased appetite, weakness and malaise/fatigue.   HENT: Negative for congestion and ear discharge.    Eyes: Negative for blurred vision and double vision.   Cardiovascular: Positive for leg swelling. Negative for chest pain, cyanosis, dyspnea on exertion, irregular heartbeat, near-syncope, orthopnea, palpitations and syncope.   Respiratory: Negative for cough, shortness of breath and sleep disturbances due to breathing.    Skin: Negative for color change and dry skin.   Musculoskeletal: Negative for joint pain, joint swelling and muscle cramps.   Gastrointestinal: Negative for bloating, heartburn, hematemesis and hematochezia.   Genitourinary: Negative for bladder incontinence and dysuria.   Neurological: Negative for aphonia, excessive daytime sleepiness, dizziness, focal weakness, headaches, light-headedness and loss of balance.   Psychiatric/Behavioral: Negative for altered mental status, depression and memory loss. The patient does not have insomnia and is not nervous/anxious.          Objective:   Physical Exam   Constitutional: She is oriented to person, place, and time. She appears well-developed and well-nourished.   HENT:   Head: Normocephalic and atraumatic.   Eyes: Conjunctivae and EOM are normal.   Neck: Normal range of motion. Neck supple. No JVD present.   Cardiovascular: Normal rate, regular rhythm and normal heart sounds.   No murmur heard.  Pulmonary/Chest: Effort normal and breath sounds normal. No respiratory distress. She has no wheezes. She has no rales.   Abdominal: Soft. Bowel sounds are normal. She exhibits no distension.   Musculoskeletal: Normal range of motion. She exhibits edema.   Neurological: She is alert and oriented to person, place, and time.   Skin: Skin is warm and dry. No erythema.   Psychiatric: She has a normal mood and affect. Her  behavior is normal. Judgment and thought content normal.   Nursing note and vitals reviewed.  1-2+ pitting edema    Lab Results    Lab Results   Component Value Date     01/30/2019    K 4.4 01/30/2019     01/30/2019    CO2 31 (H) 01/30/2019    BUN 27 (H) 01/30/2019    CREATININE 1.5 (H) 01/30/2019     (H) 01/30/2019    HGBA1C 10.5 (H) 01/12/2019    MG 1.9 01/30/2019    AST 19 01/30/2019    ALT 13 01/30/2019    ALBUMIN 2.1 (L) 01/30/2019    PROT 6.3 01/30/2019    BILITOT 0.4 01/30/2019    WBC 3.45 (L) 01/30/2019    HGB 9.6 (L) 01/30/2019    HCT 31.1 (L) 01/30/2019    MCV 91 01/30/2019     01/30/2019    INR 1.0 11/06/2018    TSH 1.835 01/27/2019    CHOL 191 01/12/2019    HDL 59 01/12/2019    LDLCALC 112.8 01/12/2019    TRIG 96 01/12/2019    CRP 17.7 (H) 01/24/2019     (H) 01/27/2019       Lipid panel  Lab Results   Component Value Date    CHOL 191 01/12/2019     Lab Results   Component Value Date    HDL 59 01/12/2019     Lab Results   Component Value Date    LDLCALC 112.8 01/12/2019     Lab Results   Component Value Date    TRIG 96 01/12/2019       Cardiac Studies  Significant Imaging: Echocardiogram:   Transthoracic echo (TTE) complete (Cupid Only):   Results for orders placed or performed during the hospital encounter of 01/11/19   Transthoracic echo (TTE) complete (Cupid Only)   Result Value Ref Range    BSA 1.96 m2    LA WIDTH 4.41 cm    AORTIC VALVE CUSP SEPERATION 1.91 cm    PV PEAK VELOCITY 0.88 cm/s    LVIDD 4.89 3.5 - 6.0 cm    IVS 0.87 0.6 - 1.1 cm    PW 0.94 0.6 - 1.1 cm    Ao root annulus 2.92 cm    LVIDS 3.41 2.1 - 4.0 cm    FS 30 28 - 44 %    LV mass 153.55 g    LA size 4.55 cm    RVDD 2.84 cm    Left Ventricle Relative Wall Thickness 0.38 cm    AV mean gradient 7.06 mmHg    AV valve area 2.28 cm2    AV index (prosthetic) 0.88     E/A ratio 1.21     E wave decelartion time 267.52 msec    IVRT 0.06 msec    Pulm vein S/D ratio 1.02     LVOT diameter 1.82 cm    LVOT area  2.60 cm2    LVOT peak VTI 21.24 cm    Ao peak jesus 1.78 m/s    Ao VTI 24.27 cm    LVOT stroke volume 55.23 cm3    AV peak gradient 12.67 mmHg    MV Peak E Jesus 1.41 m/s    MV Peak A Jesus 1.17 m/s    PV Peak S Jesus 0.59 m/s    PV Peak D Jesus 0.58 m/s    LV Systolic Volume 47.80 mL    LV Systolic Volume Index 24.4 mL/m2    LV Diastolic Volume 112.48 mL    LV Diastolic Volume Index 57.36 mL/m2    LV Mass Index 78.3 g/m2    RA Major Axis 5.29 cm    Left Atrium Major Axis 5.29 cm    Right Atrial Pressure (from IVC) 8 mmHg     · Normal left ventricular systolic function. The estimated ejection fraction is 55%  · Grade II (moderate) left ventricular diastolic dysfunction consistent with pseudonormalization.  · Normal right ventricular systolic function.  · Elevated left atrial pressure.  · Mild mitral regurgitation.  · Intermediate central venous pressure (8 mm Hg).  · Small circumferential pericardial effusion.    ECG:  normal EKG, normal sinus rhythm, unchanged from previous tracings.    Cath study : 11/2018: Prox LAD lesion , 95% stenosed reduced to 0%..  A stent was successfully placed at 12 DAVI for 20 sec.    Assessment:     1. Pedal edema    2. (HFpEF) heart failure with preserved ejection fraction    3. Coronary artery disease, angina presence unspecified, unspecified vessel or lesion type, unspecified whether native or transplanted heart    4. CKD (chronic kidney disease) stage 3, GFR 30-59 ml/min    5. Hx of non-ST elevation myocardial infarction (NSTEMI)    6. Essential hypertension    7. Mixed hyperlipidemia        Plan:     Chronic systolic LVEF, recovered LVEF: HFpEF  - Pt is doing better since discharge  - compliant with home medications: Coreg 12.5 mg BID, Imdur 30 mg, Bumex 2mg BID, Losartan 12.5 mg, and Norvasc 5 mg.  - weight looks to be about 10lb from hospital admit  - Switched to Bumex in the setting of low albumin.  - Counseled on risk factor modification including diet and medication compliance.  - no  crackles, - LE edema improving.   - will add compression stockings   - will increase coreg to 25 bid    HTN: mildly controlled  - will increase lsosartan to 25mg QD,    CAD: S/p LAD/LCx PCI in 2015, s/p pLAD MATTEO in 11/2018.  - no angina  - c/w statin, asa/plavix    NSTEMI, TYPE II  - c/w BP control as abv    HLD  Statin    Continue with current medical plan and lifestyle changes.  Return sooner for concerns or questions. If symptoms persist go to the ED  Total duration of face to face visit time 30 minutes.  Total time spent counseling greater than fifty percent of total visit time.  Counseling included discussion regarding imaging findings, diagnosis, possibilities, treatment options, risks and benefits.      Orders Placed This Encounter   Procedures    COMPRESSION STOCKINGS     Order Specific Question:   Pressure amount:     Answer:   15-20 mmHg       Follow up as scheduled. Return to clinic in 4-6 weeks for BP check   She expressed verbal understanding and agreed with the plan    Thank you for the opportunity to care for this patient. Will be available for questions if needed.     Rey Bailey MD [facility]  Interventional Cardiology  [facility]  Pager: [phone]

## 2019-02-27 ENCOUNTER — TELEPHONE (OUTPATIENT)
Dept: ADMINISTRATIVE | Facility: CLINIC | Age: 59
End: 2019-02-27

## 2019-03-01 PROBLEM — E78.5 HYPERLIPIDEMIA ASSOCIATED WITH TYPE 2 DIABETES MELLITUS: Status: ACTIVE | Noted: 2019-03-01

## 2019-03-01 PROBLEM — E11.69 HYPERLIPIDEMIA ASSOCIATED WITH TYPE 2 DIABETES MELLITUS: Status: ACTIVE | Noted: 2019-03-01

## 2019-05-31 PROBLEM — I51.89 DIASTOLIC DYSFUNCTION: Status: RESOLVED | Noted: 2019-01-01 | Resolved: 2019-01-01

## 2019-05-31 PROBLEM — I51.89 DIASTOLIC DYSFUNCTION: Status: ACTIVE | Noted: 2019-01-01

## 2019-05-31 PROBLEM — I50.9 CHF EXACERBATION: Status: RESOLVED | Noted: 2019-01-27 | Resolved: 2019-01-01

## 2019-05-31 PROBLEM — I20.89 STABLE ANGINA: Status: ACTIVE | Noted: 2019-01-01

## 2019-05-31 PROBLEM — Z91.199 NONCOMPLIANCE: Status: ACTIVE | Noted: 2019-01-01

## 2019-05-31 PROBLEM — I21.4 NSTEMI (NON-ST ELEVATED MYOCARDIAL INFARCTION): Status: ACTIVE | Noted: 2019-01-01

## 2019-05-31 PROBLEM — I25.2 HX OF NON-ST ELEVATION MYOCARDIAL INFARCTION (NSTEMI): Status: RESOLVED | Noted: 2017-01-09 | Resolved: 2019-01-01

## 2019-05-31 PROBLEM — R79.89 ELEVATED TROPONIN: Status: RESOLVED | Noted: 2018-11-12 | Resolved: 2019-01-01

## 2019-09-18 PROBLEM — E87.70 VOLUME OVERLOAD: Status: ACTIVE | Noted: 2019-01-01

## 2019-09-18 PROBLEM — E11.9 DIABETES MELLITUS: Status: ACTIVE | Noted: 2019-01-01

## 2019-09-18 PROBLEM — L97.529 TYPE 2 DIABETES MELLITUS WITH LEFT DIABETIC FOOT ULCER: Status: ACTIVE | Noted: 2019-01-01

## 2019-09-18 PROBLEM — E11.621 TYPE 2 DIABETES MELLITUS WITH LEFT DIABETIC FOOT ULCER: Status: ACTIVE | Noted: 2019-01-01

## 2019-09-19 PROBLEM — M86.9 OSTEOMYELITIS OF LEFT FOOT: Status: ACTIVE | Noted: 2019-01-01

## 2019-09-19 PROBLEM — S92.352A CLOSED DISPLACED FRACTURE OF FIFTH METATARSAL BONE OF LEFT FOOT: Status: ACTIVE | Noted: 2019-01-01

## 2019-09-20 PROBLEM — D64.9 ANEMIA: Status: ACTIVE | Noted: 2019-01-01

## 2019-10-01 PROBLEM — R45.89 DEPRESSED MOOD: Status: ACTIVE | Noted: 2019-01-01

## 2019-10-02 PROBLEM — R74.01 TRANSAMINITIS: Status: ACTIVE | Noted: 2019-01-01

## 2019-10-18 PROBLEM — H40.052 OCULAR HYPERTENSION OF LEFT EYE: Status: ACTIVE | Noted: 2019-01-01

## 2019-11-04 PROBLEM — J81.1 PULMONARY EDEMA: Status: ACTIVE | Noted: 2019-01-01

## 2019-11-04 NOTE — TELEPHONE ENCOUNTER
Returned call to pt's daughter ([place]-Listed as contact), verified pt's name and . Pt's daughter reported that she is concerned with her mother's current state. Pt's daughter states pt is currently in a Rehab Facility after toe amputation. Daughter stated that pt is currently being treated for UTI, but doesn't appear to be improving. Daughter stated that pt has gained large amount of weight and has fluid retention. Daughter stated that pt had urine output of 100 ml last night and has kidney disease. Daughter stated pt appears to be confused and at times hallucinates. She stated that Dr. Payton is provider there at facility. Advised pt's daughter that this should be reported to nurse there. She v/u. Contacted [facility] where pt currently is, spoke with [place]-charge nurse. She stated that they are currently speaking with pt's family now and are contacting Dr. Payton about pt's status.

## 2019-11-04 NOTE — TELEPHONE ENCOUNTER
Pt's daughter states pt is currently in rehab facility after toe amputation. Daughter sates that pt is being treated for UTI, but doesn't appear improved. Daughter states pt has gained large amount of weight and has fluid retention. Daughter states pt had urine output of 100 ml last night and has kidney disease. Daughter states pt appears to be confused and at times hallucinates. Daughter advised per protocol to call 911 and daughter verbalizes understanding.     Reason for Disposition   Patient sounds very sick or weak to the triager   Difficult to awaken or acting confused (e.g., disoriented, slurred speech)    Additional Information   Negative: Sounds like a life-threatening emergency to the triager   Negative: [1] Unable to urinate (or only a few drops) > 4 hours AND     [2] bladder feels very full (e.g., palpable bladder or strong urge to urinate)   Negative: Passing pure blood or large blood clots (i.e., size > a dime)  (Exceptions: flecks, small strands, or pinkish-red color)   Negative: Severe difficulty breathing (e.g., struggling for each breath, speaks in single words)    Protocols used: URINARY TRACT INFECTION ON ANTIBIOTIC FOLLOW-UP CALL - FEMALE-A-AH, WEAKNESS (GENERALIZED) AND FATIGUE-A-[place]

## 2019-11-05 PROBLEM — E83.9 CHRONIC KIDNEY DISEASE-MINERAL AND BONE DISORDER: Status: ACTIVE | Noted: 2019-01-01

## 2019-11-05 PROBLEM — M89.9 CHRONIC KIDNEY DISEASE-MINERAL AND BONE DISORDER: Status: ACTIVE | Noted: 2019-01-01

## 2019-11-05 PROBLEM — N18.9 CHRONIC KIDNEY DISEASE-MINERAL AND BONE DISORDER: Status: ACTIVE | Noted: 2019-01-01

## 2019-11-05 PROBLEM — E55.9 VITAMIN D DEFICIENCY: Status: ACTIVE | Noted: 2019-01-01

## 2019-11-05 PROBLEM — I50.33 ACUTE ON CHRONIC DIASTOLIC HEART FAILURE: Status: ACTIVE | Noted: 2019-01-01

## 2019-11-05 PROBLEM — I50.31 ACUTE DIASTOLIC HEART FAILURE: Status: ACTIVE | Noted: 2019-01-01

## 2019-11-05 PROBLEM — E66.01 MORBID OBESITY: Status: ACTIVE | Noted: 2019-01-01

## 2019-11-06 PROBLEM — T14.8XXA DEEP TISSUE INJURY: Status: ACTIVE | Noted: 2019-01-01

## 2019-11-06 PROBLEM — T14.8XXA BLISTER: Status: ACTIVE | Noted: 2019-01-01

## 2019-11-06 PROBLEM — R32 INCONTINENCE: Status: ACTIVE | Noted: 2019-01-01

## 2019-11-07 PROBLEM — E11.21 DIABETIC KIDNEY DISEASE: Status: ACTIVE | Noted: 2019-01-01

## 2019-11-08 PROBLEM — E63.9 INADEQUATE DIETARY ENERGY INTAKE: Status: ACTIVE | Noted: 2019-01-01

## 2019-11-09 PROBLEM — E83.39 HYPERPHOSPHATEMIA: Status: ACTIVE | Noted: 2019-01-01

## 2019-11-16 PROBLEM — T83.511A URINARY TRACT INFECTION ASSOCIATED WITH INDWELLING URETHRAL CATHETER: Status: ACTIVE | Noted: 2019-01-01

## 2019-11-16 PROBLEM — I50.9 ACUTE DECOMPENSATED HEART FAILURE: Status: ACTIVE | Noted: 2019-01-01

## 2019-11-16 PROBLEM — R40.0 SOMNOLENCE: Status: ACTIVE | Noted: 2019-01-01

## 2019-11-16 PROBLEM — N39.0 URINARY TRACT INFECTION ASSOCIATED WITH INDWELLING URETHRAL CATHETER: Status: ACTIVE | Noted: 2019-01-01

## 2019-11-16 PROBLEM — R41.0 DISORIENTATION: Status: ACTIVE | Noted: 2019-01-01

## 2019-11-18 PROBLEM — T81.89XD NONHEALING SURGICAL WOUND, SUBSEQUENT ENCOUNTER: Status: ACTIVE | Noted: 2019-01-01

## 2019-11-21 NOTE — PROGRESS NOTES
Patient admitted as an inpatient. Pt seen at bedside, last seen POW1 after ahmed tube placement/AC washout. Pt has no eye pain or discomfort.       Hyphema, left eye     Pseudophakia of both eyes     Severe nonproliferative diabetic retinopathy of right eye without macular edema associated with type 2 diabetes mellitus     VA: 20/HM to 20/LP   IOP OS 20-26  mmHg --tonopen      LEFT EYE   Lids: WNL   Conjunctiva: sutures intact, no leaking at surgical site, mild bleb formed,   Cornea: Clear, suture intact at 4 o'clock.   AC deep with no tube or iris touch, Hyphema/clot about 3-4 mm inferiorly, clot/fibrotic material blocking nasal portion of pupil, tube over superior pupil margin   Iris: irregular iris, view to temporal portion only  Lens: PCIOL in place  Vitreous: no view          PLAN   POW4  s/p  AC washout/Ant Vitrectomy/MIGUELITO/AHmed placement   Doing well, pain free, Tube and AC visible, blood and clot resolving.      Recommend   · Keep head elevated above chest at 35 degree- 45 degree angle    · Pred-Forte 1 drop  TID in left eye   · Atropine 1 drop  BID in left eye   · Cosopt 1 drop TID in left eye   · Alphagan 1 drop TID in left eye    · 2-3 mins in between eye drops   · Shield at night, eye protection during the day  · No lifting, no bending, no eye rubbing     F/U 1-2 weeks for AC check and IOP check      Cristhian Zambrano MD  Can call ophthalmology resident on call if any questions.

## 2020-01-22 PROBLEM — N17.9 ACUTE KIDNEY INJURY: Status: ACTIVE | Noted: 2020-01-01

## 2020-01-23 PROBLEM — E66.9 OBESITY (BMI 30-39.9): Status: ACTIVE | Noted: 2020-01-01

## 2020-01-24 PROBLEM — L89.95 UNSTAGEABLE PRESSURE INJURY OF SKIN AND TISSUE: Status: ACTIVE | Noted: 2020-01-01

## 2020-01-24 PROBLEM — T81.89XA NON-HEALING SURGICAL WOUND: Status: ACTIVE | Noted: 2020-01-01

## 2020-01-24 PROBLEM — L89.92 PRESSURE INJURY, STAGE 2: Status: ACTIVE | Noted: 2020-01-01

## 2020-01-24 PROBLEM — E11.621 DIABETIC ULCER OF LEFT FOOT: Status: ACTIVE | Noted: 2020-01-01

## 2020-01-24 PROBLEM — L97.529 DIABETIC ULCER OF LEFT FOOT: Status: ACTIVE | Noted: 2020-01-01

## 2020-01-24 PROBLEM — R63.8 ALTERATION IN NUTRITION: Status: ACTIVE | Noted: 2020-01-01

## 2020-01-25 PROBLEM — E87.20 METABOLIC ACIDOSIS: Status: ACTIVE | Noted: 2020-01-01

## 2020-01-25 PROBLEM — E87.5 HYPERKALEMIA: Status: ACTIVE | Noted: 2020-01-01

## 2020-03-08 PROBLEM — J96.90 RESPIRATORY FAILURE: Status: ACTIVE | Noted: 2020-01-01

## 2020-03-09 PROBLEM — I46.9 CARDIAC ARREST: Status: ACTIVE | Noted: 2020-01-01

## 2020-03-11 PROBLEM — G93.1 ANOXIC ENCEPHALOPATHY: Status: ACTIVE | Noted: 2020-01-01

## 2020-03-12 PROBLEM — N18.6 ESRD (END STAGE RENAL DISEASE): Status: ACTIVE | Noted: 2020-01-01

## 2020-03-13 PROBLEM — Z51.5 COMFORT MEASURES ONLY STATUS: Status: ACTIVE | Noted: 2020-01-01

## (undated) DEVICE — PAD HEARTSTART DEFIB ADULT

## (undated) DEVICE — KIT GLIDESHEATH SLEND 6FR 10CM

## (undated) DEVICE — VISE RADIFOCUS MULTI TORQUE

## (undated) DEVICE — CATH TREK RX 3.0MM X 20MM

## (undated) DEVICE — HEMOSTAT VASC BAND REG 24CM

## (undated) DEVICE — GUIDE LAUNCHER 6FR EBU 3.5

## (undated) DEVICE — CATH IMPULSE PIGTAIL 5FR 125CM

## (undated) DEVICE — KIT LEFT HEART MANIFOLD CUSTOM

## (undated) DEVICE — COVER PROBE US 5.5X58L NON LTX

## (undated) DEVICE — BLLN SC EUPHORA RX 2.50MMX15MM

## (undated) DEVICE — NAMIC CONTRAST CONTROLLER

## (undated) DEVICE — SEE MEDLINE ITEM 157187

## (undated) DEVICE — Device

## (undated) DEVICE — INFLATOR ENCORE 26 BLLN INFL

## (undated) DEVICE — SYR MED RAD 150ML

## (undated) DEVICE — CATH EAGLE EYE ST .014X20X150

## (undated) DEVICE — KIT INTRODUCER W/GUIDEWIRE

## (undated) DEVICE — GUIDEWIRE COUGAR XT 190 ST HY

## (undated) DEVICE — CONTRAST VISIPAQUE 150ML

## (undated) DEVICE — DEFIBRILLATOR STAT PADZ II